# Patient Record
Sex: MALE | Race: OTHER | HISPANIC OR LATINO | URBAN - METROPOLITAN AREA
[De-identification: names, ages, dates, MRNs, and addresses within clinical notes are randomized per-mention and may not be internally consistent; named-entity substitution may affect disease eponyms.]

---

## 2019-10-10 ENCOUNTER — EMERGENCY (EMERGENCY)
Facility: HOSPITAL | Age: 34
LOS: 0 days | Discharge: HOME | End: 2019-10-10
Attending: HOSPITALIST
Payer: MEDICAID

## 2019-10-10 VITALS — HEIGHT: 65 IN | WEIGHT: 130.07 LBS

## 2019-10-10 VITALS
DIASTOLIC BLOOD PRESSURE: 80 MMHG | OXYGEN SATURATION: 100 % | TEMPERATURE: 98 F | SYSTOLIC BLOOD PRESSURE: 141 MMHG | RESPIRATION RATE: 18 BRPM | HEART RATE: 78 BPM

## 2019-10-10 DIAGNOSIS — H57.89 OTHER SPECIFIED DISORDERS OF EYE AND ADNEXA: ICD-10-CM

## 2019-10-10 DIAGNOSIS — H00.011 HORDEOLUM EXTERNUM RIGHT UPPER EYELID: ICD-10-CM

## 2019-10-10 PROCEDURE — 99283 EMERGENCY DEPT VISIT LOW MDM: CPT

## 2019-10-10 RX ORDER — ERYTHROMYCIN BASE 5 MG/GRAM
1 OINTMENT (GRAM) OPHTHALMIC (EYE) ONCE
Refills: 0 | Status: COMPLETED | OUTPATIENT
Start: 2019-10-10 | End: 2019-10-10

## 2019-10-10 RX ADMIN — Medication 1 APPLICATION(S): at 10:32

## 2019-10-10 NOTE — ED PROVIDER NOTE - NSFOLLOWUPINSTRUCTIONS_ED_ALL_ED_FT
Orzuelo  Stye  Image   Un orzuelo, también conocido hugo hordeolum, es angeles protuberancia que se forma en un párpado. Puede parecer un grano junto a la pestaña. Puede formarse dentro del párpado (orzuelo interno) o fuera del párpado (orzuelo externo). Un orzuelo puede causar enrojecimiento, hinchazón y dolor en el párpado.  Los orzuelos son muy frecuentes. Todas las personas pueden tener orzuelos a cualquier edad. Suelen ocurrir solo en un yasmeen, filomena puede tener más de jama en los dos ojos.  ¿Cuáles son las causas?  La causa de un orzuelo es angeles infección. La infección poncho siempre es causada por angeles bacteria llamada Staphylococcus aureus. Es un tipo común de bacteria que vive en la piel.  Un orzuelo interno puede ser causado por angeles infección en angeles glándula sebácea dentro del párpado. Un orzuelo externo puede ser causado por angeles infección en la base de la pestaña (folículo piloso).  ¿Qué incrementa el riesgo?  Angeles persona es más propensa a tener un orzuelo en los siguientes casos:  Si tuvo un orzuelo antes.Si tiene alguna de estas afecciones:  Diabetes.Enrojecimiento, picazón e inflamación en los párpados (blefaritis).Angeles afección de la piel llamada dermatitis seborreica o rosácea.Niveles altos de grasa en la keli (lípidos).¿Cuáles son los signos o síntomas?  El síntoma más frecuente del orzuelo es el dolor en el párpado. Los orzuelos internos son más dolorosos que los externos. Otros síntomas pueden ser los siguientes:  Hinchazón dolorosa del párpado.Sensación de picazón en el yasmeen.Lagrimeo y enrojecimiento del yasmeen.Pus que drena del orzuelo.¿Cómo se diagnostica?  Con tan solo examinarle el yasmeen, el médico puede diagnosticarle un orzuelo. También puede revisarlo para asegurarse de que:  No tenga fiebre ni otros signos de angeles infección más grave.La infección no se haya diseminado a otras partes del yasmeen o a zonas circundantes.¿Cómo se trata?  En la mayoría de los casos, el orzuelo desaparece en el transcurso de unos días sin tratamiento o con la aplicación de compresas tibias. Es posible que sea necesario usar gotas o pomada con antibiótico para tratar la infección.  En algunos casos, si el orzuelo no se lucio con el tratamiento de rutina, el médico puede drenarle el pus del orzuelo con un bisturí de hoja lauren o angeles aguja. Penermon puede hacerse si el orzuelo es isabelle, ocasiona mucho dolor o afecta la vista.  Siga estas indicaciones en garcia casa:  [place] los medicamentos de venta azeb y los recetados solamente hugo se lo haya indicado el médico. Estos incluyen gotas o pomadas para los ojos.Si le recetaron un antibiótico, aplíqueselo o úselo hugo se lo haya indicado el médico. No deje de usar el antibiótico, ni siquiera si el cuadro clínico mejora.Aplique un paño húmedo y tibio (compresa tibia) sobre el yasmeen bridgett 5 a 10 minutos, 4 veces al día.Limpie el párpado afectado hugo se lo haya indicado el médico.No use lentes de contacto ni maquillaje para los ojos hasta que el orzuelo se haya curado.No trate de reventar o drenar el orzuelo. No se frote el yasmeen.Comuníquese con un médico si:  Tiene escalofríos o fiebre.El orzuelo no desaparece después de varios días.El orzuelo afecta la visión.Comienza a sentir dolor en el globo ocular, o se le hincha o enrojece.Solicite ayuda de inmediato si:  Siente dolor al  el yasmeen.Resumen  Un orzuelo es angeles protuberancia que se forma en un párpado. Puede parecer un grano junto a la pestaña.Puede formarse dentro del párpado (orzuelo interno) o fuera del párpado (orzuelo externo). Un orzuelo puede causar enrojecimiento, hinchazón y dolor en el párpado.Con tan solo examinarle el yasmeen, el médico puede diagnosticarle un orzuelo.Aplique un paño húmedo y tibio (compresa tibia) sobre el yasmeen bridgett 5 a 10 minutos, 4 veces al día.Esta información no tiene hugo fin reemplazar el consejo del médico. Asegúrese de hacerle al médico cualquier pregunta que tenga.

## 2019-10-10 NOTE — ED ADULT TRIAGE NOTE - CHIEF COMPLAINT QUOTE
Pt c/o right upper eyelid swelling and pain x 1 week , and left eyelid painless nodule x 1 year , pt works as a  . Denies any visual disturbances

## 2019-10-10 NOTE — ED PROVIDER NOTE - NS_ ATTENDINGSCRIBEDETAILS _ED_A_ED_FT
Allegra Shay MD: The history, relevant review of systems, past medical and surgical history, medical decision making, and physical examination was documented by the scribe in my presence and I attest to the accuracy of the documentation.

## 2019-10-10 NOTE — ED PROVIDER NOTE - PATIENT PORTAL LINK FT
You can access the FollowMyHealth Patient Portal offered by [facility] by registering at the following website: [email or web address]. By joining [employer]’s FollowMyHealth portal, you will also be able to view your health information using other applications (apps) compatible with our system.

## 2019-10-10 NOTE — ED ADULT NURSE NOTE - OBJECTIVE STATEMENT
pt presents to ED c/o R upper eyelid pain and swelling x 1 wk, reports nodule to L eyelid x 1 yr. pt denies visual changes. denies trauma or injury, but works as  with machinery. has not taken anything for pain. denies fevers/chills.

## 2019-10-10 NOTE — ED PROVIDER NOTE - EYES, MLM
Clear bilaterally, pupils equal, round and reactive to light. +draining hordeolum to right upper eyelid with surrounding erythema, localized over bump with bump to left upper eyelid likely resolved hordeolum, 20/30 bilateral visual acuity

## 2019-10-10 NOTE — ED PROVIDER NOTE - OBJECTIVE STATEMENT
32 y/o male with no pertinent PMHx presents to the ED c/o 1 week right upper lid swelling and erythema with pus draining since yesterday. Pt had something like this on the other eye but didn't come to the hospital. Some photosensitivity. Pt is also having blurry vision and crusting in eye.   # [id number]

## 2019-10-10 NOTE — ED PROVIDER NOTE - NSFOLLOWUPCLINICS_GEN_ALL_ED_FT
[facility]  Family Medicine  [address] [place] [address]  Phone: [phone]  Fax:   Follow Up Time: 1-3 Days

## 2019-10-10 NOTE — ED PROVIDER NOTE - CLINICAL SUMMARY MEDICAL DECISION MAKING FREE TEXT BOX
35 y/o male with draining hordeolum to right upper eyelid recommended ibuprofen for pain warm compresses. Will give topical abx ointment and social work as per pt request. 32 y/o male with draining hordeolum to right upper eyelid recommended ibuprofen for pain warm compresses. Will give topical abx ointment and social work as per pt request.

## 2020-04-01 ENCOUNTER — EMERGENCY (EMERGENCY)
Facility: HOSPITAL | Age: 35
LOS: 0 days | Discharge: HOME | End: 2020-04-01
Payer: MEDICAID

## 2020-04-01 VITALS
HEART RATE: 84 BPM | DIASTOLIC BLOOD PRESSURE: 75 MMHG | TEMPERATURE: 98 F | SYSTOLIC BLOOD PRESSURE: 113 MMHG | RESPIRATION RATE: 16 BRPM | OXYGEN SATURATION: 100 %

## 2020-04-01 DIAGNOSIS — U07.1 COVID-19: ICD-10-CM

## 2020-04-01 DIAGNOSIS — Z20.828 CONTACT WITH AND (SUSPECTED) EXPOSURE TO OTHER VIRAL COMMUNICABLE DISEASES: ICD-10-CM

## 2020-04-01 DIAGNOSIS — R50.9 FEVER, UNSPECIFIED: ICD-10-CM

## 2020-04-01 PROBLEM — Z78.9 OTHER SPECIFIED HEALTH STATUS: Chronic | Status: ACTIVE | Noted: 2019-10-10

## 2020-04-01 PROCEDURE — 99283 EMERGENCY DEPT VISIT LOW MDM: CPT

## 2020-04-01 PROCEDURE — 87635 SARS-COV-2 COVID-19 AMP PRB: CPT

## 2020-04-01 NOTE — ED STATDOCS - PATIENT PORTAL LINK FT
You can access the FollowMyHealth Patient Portal offered by [facility] by registering at the following website: [email or web address]. By joining [employer]’s FollowMyHealth portal, you will also be able to view your health information using other applications (apps) compatible with our system.

## 2020-04-01 NOTE — ED STATDOCS - NSFOLLOWUPINSTRUCTIONS_ED_ALL_ED_FT
How to get your Coronavirus (COVID-19) Testing Results:   Please be advised that you were tested for the coronavirus (COVID-19) in the Emergency Department at [facility].  You are to maintain self-quarantine procedures for 14 days until instructed otherwise by one of our healthcare agents. Please note that the test may take up to 2-4 days to result.  If you do not hear from us within 72 hours and you'd like to check on your results, you can call on of our coronavirus specialists at [address] [place] [address] (available 24/7).  Please DO NOT call the site where you received the test to obtain your results.

## 2020-04-03 LAB — SARS-COV-2 RNA SPEC QL NAA+PROBE: (no result)

## 2020-04-19 ENCOUNTER — EMERGENCY (EMERGENCY)
Facility: HOSPITAL | Age: 35
LOS: 0 days | Discharge: HOME | End: 2020-04-19
Attending: EMERGENCY MEDICINE
Payer: MEDICAID

## 2020-04-19 VITALS
OXYGEN SATURATION: 99 % | RESPIRATION RATE: 17 BRPM | HEART RATE: 75 BPM | SYSTOLIC BLOOD PRESSURE: 108 MMHG | DIASTOLIC BLOOD PRESSURE: 80 MMHG

## 2020-04-19 VITALS
TEMPERATURE: 98 F | SYSTOLIC BLOOD PRESSURE: 114 MMHG | HEART RATE: 87 BPM | HEIGHT: 66 IN | WEIGHT: 134.92 LBS | DIASTOLIC BLOOD PRESSURE: 90 MMHG | RESPIRATION RATE: 19 BRPM | OXYGEN SATURATION: 97 %

## 2020-04-19 DIAGNOSIS — R06.02 SHORTNESS OF BREATH: ICD-10-CM

## 2020-04-19 DIAGNOSIS — R05 COUGH: ICD-10-CM

## 2020-04-19 DIAGNOSIS — M79.10 MYALGIA, UNSPECIFIED SITE: ICD-10-CM

## 2020-04-19 DIAGNOSIS — U07.1 COVID-19: ICD-10-CM

## 2020-04-19 LAB
ALBUMIN SERPL ELPH-MCNC: 4 G/DL — SIGNIFICANT CHANGE UP (ref 3.3–5)
ALP SERPL-CCNC: 98 U/L — SIGNIFICANT CHANGE UP (ref 40–120)
ALT FLD-CCNC: 113 U/L — HIGH (ref 12–78)
ANION GAP SERPL CALC-SCNC: 5 MMOL/L — SIGNIFICANT CHANGE UP (ref 5–17)
APTT BLD: 33.5 SEC — SIGNIFICANT CHANGE UP (ref 27.5–36.3)
AST SERPL-CCNC: 32 U/L — SIGNIFICANT CHANGE UP (ref 15–37)
BASOPHILS # BLD AUTO: 0.03 K/UL — SIGNIFICANT CHANGE UP (ref 0–0.2)
BASOPHILS NFR BLD AUTO: 0.7 % — SIGNIFICANT CHANGE UP (ref 0–2)
BILIRUB SERPL-MCNC: 0.8 MG/DL — SIGNIFICANT CHANGE UP (ref 0.2–1.2)
BUN SERPL-MCNC: 5 MG/DL — LOW (ref 7–23)
CALCIUM SERPL-MCNC: 9.2 MG/DL — SIGNIFICANT CHANGE UP (ref 8.5–10.1)
CHLORIDE SERPL-SCNC: 104 MMOL/L — SIGNIFICANT CHANGE UP (ref 96–108)
CO2 SERPL-SCNC: 29 MMOL/L — SIGNIFICANT CHANGE UP (ref 22–31)
CREAT SERPL-MCNC: 0.88 MG/DL — SIGNIFICANT CHANGE UP (ref 0.5–1.3)
CRP SERPL-MCNC: <0.1 MG/DL — SIGNIFICANT CHANGE UP (ref 0–0.4)
D DIMER BLD IA.RAPID-MCNC: <150 NG/ML DDU — SIGNIFICANT CHANGE UP
EOSINOPHIL # BLD AUTO: 0.03 K/UL — SIGNIFICANT CHANGE UP (ref 0–0.5)
EOSINOPHIL NFR BLD AUTO: 0.7 % — SIGNIFICANT CHANGE UP (ref 0–6)
FERRITIN SERPL-MCNC: 505 NG/ML — HIGH (ref 30–400)
GLUCOSE SERPL-MCNC: 108 MG/DL — HIGH (ref 70–99)
HCT VFR BLD CALC: 46 % — SIGNIFICANT CHANGE UP (ref 39–50)
HGB BLD-MCNC: 15.8 G/DL — SIGNIFICANT CHANGE UP (ref 13–17)
IMM GRANULOCYTES NFR BLD AUTO: 0.2 % — SIGNIFICANT CHANGE UP (ref 0–1.5)
INR BLD: 1.07 RATIO — SIGNIFICANT CHANGE UP (ref 0.88–1.16)
LDH SERPL L TO P-CCNC: 164 U/L — SIGNIFICANT CHANGE UP (ref 84–241)
LYMPHOCYTES # BLD AUTO: 0.96 K/UL — LOW (ref 1–3.3)
LYMPHOCYTES # BLD AUTO: 23.5 % — SIGNIFICANT CHANGE UP (ref 13–44)
MCHC RBC-ENTMCNC: 29.6 PG — SIGNIFICANT CHANGE UP (ref 27–34)
MCHC RBC-ENTMCNC: 34.3 GM/DL — SIGNIFICANT CHANGE UP (ref 32–36)
MCV RBC AUTO: 86.3 FL — SIGNIFICANT CHANGE UP (ref 80–100)
MONOCYTES # BLD AUTO: 0.32 K/UL — SIGNIFICANT CHANGE UP (ref 0–0.9)
MONOCYTES NFR BLD AUTO: 7.8 % — SIGNIFICANT CHANGE UP (ref 2–14)
NEUTROPHILS # BLD AUTO: 2.73 K/UL — SIGNIFICANT CHANGE UP (ref 1.8–7.4)
NEUTROPHILS NFR BLD AUTO: 67.1 % — SIGNIFICANT CHANGE UP (ref 43–77)
PLATELET # BLD AUTO: 296 K/UL — SIGNIFICANT CHANGE UP (ref 150–400)
POTASSIUM SERPL-MCNC: 4.1 MMOL/L — SIGNIFICANT CHANGE UP (ref 3.5–5.3)
POTASSIUM SERPL-SCNC: 4.1 MMOL/L — SIGNIFICANT CHANGE UP (ref 3.5–5.3)
PROCALCITONIN SERPL-MCNC: 0.04 NG/ML — SIGNIFICANT CHANGE UP (ref 0.02–0.1)
PROT SERPL-MCNC: 7.9 GM/DL — SIGNIFICANT CHANGE UP (ref 6–8.3)
PROTHROM AB SERPL-ACNC: 11.9 SEC — SIGNIFICANT CHANGE UP (ref 10–12.9)
RBC # BLD: 5.33 M/UL — SIGNIFICANT CHANGE UP (ref 4.2–5.8)
RBC # FLD: 12 % — SIGNIFICANT CHANGE UP (ref 10.3–14.5)
SODIUM SERPL-SCNC: 138 MMOL/L — SIGNIFICANT CHANGE UP (ref 135–145)
WBC # BLD: 4.08 K/UL — SIGNIFICANT CHANGE UP (ref 3.8–10.5)
WBC # FLD AUTO: 4.08 K/UL — SIGNIFICANT CHANGE UP (ref 3.8–10.5)

## 2020-04-19 PROCEDURE — 85379 FIBRIN DEGRADATION QUANT: CPT

## 2020-04-19 PROCEDURE — 99283 EMERGENCY DEPT VISIT LOW MDM: CPT | Mod: 25

## 2020-04-19 PROCEDURE — 80053 COMPREHEN METABOLIC PANEL: CPT

## 2020-04-19 PROCEDURE — 71045 X-RAY EXAM CHEST 1 VIEW: CPT | Mod: 26

## 2020-04-19 PROCEDURE — 84145 PROCALCITONIN (PCT): CPT

## 2020-04-19 PROCEDURE — 82728 ASSAY OF FERRITIN: CPT

## 2020-04-19 PROCEDURE — 93005 ELECTROCARDIOGRAM TRACING: CPT

## 2020-04-19 PROCEDURE — 36415 COLL VENOUS BLD VENIPUNCTURE: CPT

## 2020-04-19 PROCEDURE — 71045 X-RAY EXAM CHEST 1 VIEW: CPT

## 2020-04-19 PROCEDURE — 99283 EMERGENCY DEPT VISIT LOW MDM: CPT

## 2020-04-19 PROCEDURE — 93010 ELECTROCARDIOGRAM REPORT: CPT

## 2020-04-19 PROCEDURE — 85025 COMPLETE CBC W/AUTO DIFF WBC: CPT

## 2020-04-19 PROCEDURE — 83615 LACTATE (LD) (LDH) ENZYME: CPT

## 2020-04-19 PROCEDURE — 85730 THROMBOPLASTIN TIME PARTIAL: CPT

## 2020-04-19 PROCEDURE — 86140 C-REACTIVE PROTEIN: CPT

## 2020-04-19 PROCEDURE — 85610 PROTHROMBIN TIME: CPT

## 2020-04-19 RX ORDER — ACETAMINOPHEN 500 MG
1000 TABLET ORAL ONCE
Refills: 0 | Status: COMPLETED | OUTPATIENT
Start: 2020-04-19 | End: 2020-04-19

## 2020-04-19 RX ADMIN — Medication 1000 MILLIGRAM(S): at 10:59

## 2020-04-19 NOTE — ED PROVIDER NOTE - PATIENT PORTAL LINK FT
You can access the FollowMyHealth Patient Portal offered by [facility] by registering at the following website: [email or web address]. By joining [employer]’s FollowMyHealth portal, you will also be able to view your health information using other applications (apps) compatible with our system.

## 2020-04-19 NOTE — ED PROVIDER NOTE - CONSTITUTIONAL, MLM
normal... +Mildly uncomfortable. Well appearing, awake, alert, oriented to person, place, time/situation.

## 2020-04-19 NOTE — ED ADULT TRIAGE NOTE - CHIEF COMPLAINT QUOTE
Pt ambulatory to pivot triage for evaluation of back pain, COVID+, in no respiratory distress, oxygen sat on RA is 99%

## 2020-04-19 NOTE — ED PROVIDER NOTE - OBJECTIVE STATEMENT
34 YOM no PMHx presents to the ED c/o 2 weeks of COVID symptoms. [employer]  Santi used for Persian translation. Pt presenting with 2 weeks of cough, body aches and headaches. Pt had some shortness of breath today and is known COVID + so came to ER for evaluation. Pt has been using Tylenol at home without lasting relief.

## 2020-04-19 NOTE — ED ADULT NURSE NOTE - OBJECTIVE STATEMENT
pt came to ED for evaluation of shortness of breath, cough, fever, and body aches x 2 weeks. pt COVID +. denies any other complaints

## 2020-04-19 NOTE — ED PROVIDER NOTE - NSFOLLOWUPCLINICS_GEN_ALL_ED_FT
[facility]  Family Medicine  [address] [place] [address]  Phone: [phone]  Fax:   Follow Up Time:

## 2020-04-19 NOTE — ED PROVIDER NOTE - NSFOLLOWUPINSTRUCTIONS_ED_ALL_ED_FT
Acetaminophen for pain/fever  Fluids  See printed COVID instructions  Follow-up with your regular physician or the [facility]  Return with any persistent/worsening symptoms.

## 2020-05-08 ENCOUNTER — EMERGENCY (EMERGENCY)
Facility: HOSPITAL | Age: 35
LOS: 0 days | Discharge: HOME | End: 2020-05-08
Attending: EMERGENCY MEDICINE
Payer: MEDICAID

## 2020-05-08 VITALS — HEIGHT: 66 IN | WEIGHT: 149.91 LBS

## 2020-05-08 VITALS
RESPIRATION RATE: 18 BRPM | SYSTOLIC BLOOD PRESSURE: 120 MMHG | HEART RATE: 74 BPM | DIASTOLIC BLOOD PRESSURE: 76 MMHG | OXYGEN SATURATION: 100 % | TEMPERATURE: 100 F

## 2020-05-08 DIAGNOSIS — R07.89 OTHER CHEST PAIN: ICD-10-CM

## 2020-05-08 DIAGNOSIS — U07.1 COVID-19: ICD-10-CM

## 2020-05-08 DIAGNOSIS — R30.0 DYSURIA: ICD-10-CM

## 2020-05-08 LAB
ALBUMIN SERPL ELPH-MCNC: 4.3 G/DL — SIGNIFICANT CHANGE UP (ref 3.3–5)
ALP SERPL-CCNC: 92 U/L — SIGNIFICANT CHANGE UP (ref 40–120)
ALT FLD-CCNC: 93 U/L — HIGH (ref 12–78)
ANION GAP SERPL CALC-SCNC: 2 MMOL/L — LOW (ref 5–17)
APPEARANCE UR: CLEAR — SIGNIFICANT CHANGE UP
APTT BLD: 33.4 SEC — SIGNIFICANT CHANGE UP (ref 27.5–36.3)
AST SERPL-CCNC: 32 U/L — SIGNIFICANT CHANGE UP (ref 15–37)
BASOPHILS # BLD AUTO: 0.01 K/UL — SIGNIFICANT CHANGE UP (ref 0–0.2)
BASOPHILS NFR BLD AUTO: 0.2 % — SIGNIFICANT CHANGE UP (ref 0–2)
BILIRUB SERPL-MCNC: 0.6 MG/DL — SIGNIFICANT CHANGE UP (ref 0.2–1.2)
BILIRUB UR-MCNC: NEGATIVE — SIGNIFICANT CHANGE UP
BUN SERPL-MCNC: 6 MG/DL — LOW (ref 7–23)
CALCIUM SERPL-MCNC: 9.7 MG/DL — SIGNIFICANT CHANGE UP (ref 8.5–10.1)
CHLORIDE SERPL-SCNC: 105 MMOL/L — SIGNIFICANT CHANGE UP (ref 96–108)
CO2 SERPL-SCNC: 33 MMOL/L — HIGH (ref 22–31)
COLOR SPEC: YELLOW — SIGNIFICANT CHANGE UP
CREAT SERPL-MCNC: 0.8 MG/DL — SIGNIFICANT CHANGE UP (ref 0.5–1.3)
D DIMER BLD IA.RAPID-MCNC: <150 NG/ML DDU — SIGNIFICANT CHANGE UP
DIFF PNL FLD: NEGATIVE — SIGNIFICANT CHANGE UP
EOSINOPHIL # BLD AUTO: 0.18 K/UL — SIGNIFICANT CHANGE UP (ref 0–0.5)
EOSINOPHIL NFR BLD AUTO: 3.5 % — SIGNIFICANT CHANGE UP (ref 0–6)
GLUCOSE SERPL-MCNC: 83 MG/DL — SIGNIFICANT CHANGE UP (ref 70–99)
GLUCOSE UR QL: NEGATIVE MG/DL — SIGNIFICANT CHANGE UP
HCT VFR BLD CALC: 46.3 % — SIGNIFICANT CHANGE UP (ref 39–50)
HGB BLD-MCNC: 16 G/DL — SIGNIFICANT CHANGE UP (ref 13–17)
IMM GRANULOCYTES NFR BLD AUTO: 0.4 % — SIGNIFICANT CHANGE UP (ref 0–1.5)
INR BLD: 1 RATIO — SIGNIFICANT CHANGE UP (ref 0.88–1.16)
KETONES UR-MCNC: NEGATIVE — SIGNIFICANT CHANGE UP
LEUKOCYTE ESTERASE UR-ACNC: NEGATIVE — SIGNIFICANT CHANGE UP
LYMPHOCYTES # BLD AUTO: 1.4 K/UL — SIGNIFICANT CHANGE UP (ref 1–3.3)
LYMPHOCYTES # BLD AUTO: 27.1 % — SIGNIFICANT CHANGE UP (ref 13–44)
MCHC RBC-ENTMCNC: 29.6 PG — SIGNIFICANT CHANGE UP (ref 27–34)
MCHC RBC-ENTMCNC: 34.6 GM/DL — SIGNIFICANT CHANGE UP (ref 32–36)
MCV RBC AUTO: 85.7 FL — SIGNIFICANT CHANGE UP (ref 80–100)
MONOCYTES # BLD AUTO: 0.45 K/UL — SIGNIFICANT CHANGE UP (ref 0–0.9)
MONOCYTES NFR BLD AUTO: 8.7 % — SIGNIFICANT CHANGE UP (ref 2–14)
NEUTROPHILS # BLD AUTO: 3.11 K/UL — SIGNIFICANT CHANGE UP (ref 1.8–7.4)
NEUTROPHILS NFR BLD AUTO: 60.1 % — SIGNIFICANT CHANGE UP (ref 43–77)
NITRITE UR-MCNC: NEGATIVE — SIGNIFICANT CHANGE UP
PH UR: 7 — SIGNIFICANT CHANGE UP (ref 5–8)
PLATELET # BLD AUTO: 245 K/UL — SIGNIFICANT CHANGE UP (ref 150–400)
POTASSIUM SERPL-MCNC: 4.5 MMOL/L — SIGNIFICANT CHANGE UP (ref 3.5–5.3)
POTASSIUM SERPL-SCNC: 4.5 MMOL/L — SIGNIFICANT CHANGE UP (ref 3.5–5.3)
PROT SERPL-MCNC: 8.1 GM/DL — SIGNIFICANT CHANGE UP (ref 6–8.3)
PROT UR-MCNC: NEGATIVE MG/DL — SIGNIFICANT CHANGE UP
PROTHROM AB SERPL-ACNC: 11.1 SEC — SIGNIFICANT CHANGE UP (ref 10–12.9)
RBC # BLD: 5.4 M/UL — SIGNIFICANT CHANGE UP (ref 4.2–5.8)
RBC # FLD: 12.2 % — SIGNIFICANT CHANGE UP (ref 10.3–14.5)
SODIUM SERPL-SCNC: 140 MMOL/L — SIGNIFICANT CHANGE UP (ref 135–145)
SP GR SPEC: 1 — LOW (ref 1.01–1.02)
TROPONIN I SERPL-MCNC: <0.015 NG/ML — SIGNIFICANT CHANGE UP (ref 0.01–0.04)
UROBILINOGEN FLD QL: NEGATIVE MG/DL — SIGNIFICANT CHANGE UP
WBC # BLD: 5.17 K/UL — SIGNIFICANT CHANGE UP (ref 3.8–10.5)
WBC # FLD AUTO: 5.17 K/UL — SIGNIFICANT CHANGE UP (ref 3.8–10.5)

## 2020-05-08 PROCEDURE — 96361 HYDRATE IV INFUSION ADD-ON: CPT

## 2020-05-08 PROCEDURE — 81003 URINALYSIS AUTO W/O SCOPE: CPT

## 2020-05-08 PROCEDURE — 85730 THROMBOPLASTIN TIME PARTIAL: CPT

## 2020-05-08 PROCEDURE — 93010 ELECTROCARDIOGRAM REPORT: CPT

## 2020-05-08 PROCEDURE — 85025 COMPLETE CBC W/AUTO DIFF WBC: CPT

## 2020-05-08 PROCEDURE — 96374 THER/PROPH/DIAG INJ IV PUSH: CPT

## 2020-05-08 PROCEDURE — 85379 FIBRIN DEGRADATION QUANT: CPT

## 2020-05-08 PROCEDURE — 99284 EMERGENCY DEPT VISIT MOD MDM: CPT

## 2020-05-08 PROCEDURE — 71045 X-RAY EXAM CHEST 1 VIEW: CPT

## 2020-05-08 PROCEDURE — 80053 COMPREHEN METABOLIC PANEL: CPT

## 2020-05-08 PROCEDURE — 36415 COLL VENOUS BLD VENIPUNCTURE: CPT

## 2020-05-08 PROCEDURE — 85610 PROTHROMBIN TIME: CPT

## 2020-05-08 PROCEDURE — 93005 ELECTROCARDIOGRAM TRACING: CPT

## 2020-05-08 PROCEDURE — 87086 URINE CULTURE/COLONY COUNT: CPT

## 2020-05-08 PROCEDURE — 71045 X-RAY EXAM CHEST 1 VIEW: CPT | Mod: 26

## 2020-05-08 PROCEDURE — 99284 EMERGENCY DEPT VISIT MOD MDM: CPT | Mod: 25

## 2020-05-08 PROCEDURE — 84484 ASSAY OF TROPONIN QUANT: CPT

## 2020-05-08 RX ORDER — SODIUM CHLORIDE 9 MG/ML
1000 INJECTION INTRAMUSCULAR; INTRAVENOUS; SUBCUTANEOUS ONCE
Refills: 0 | Status: COMPLETED | OUTPATIENT
Start: 2020-05-08 | End: 2020-05-08

## 2020-05-08 RX ORDER — KETOROLAC TROMETHAMINE 30 MG/ML
15 SYRINGE (ML) INJECTION ONCE
Refills: 0 | Status: DISCONTINUED | OUTPATIENT
Start: 2020-05-08 | End: 2020-05-08

## 2020-05-08 RX ADMIN — Medication 15 MILLIGRAM(S): at 12:10

## 2020-05-08 RX ADMIN — SODIUM CHLORIDE 1000 MILLILITER(S): 9 INJECTION INTRAMUSCULAR; INTRAVENOUS; SUBCUTANEOUS at 13:15

## 2020-05-08 RX ADMIN — SODIUM CHLORIDE 1000 MILLILITER(S): 9 INJECTION INTRAMUSCULAR; INTRAVENOUS; SUBCUTANEOUS at 12:15

## 2020-05-08 NOTE — ED PROVIDER NOTE - PROGRESS NOTE DETAILS
ID [id number].  Patient seen and evaluated by me, well appearing, presenting with concern about persistent chest pain radiating to back since being diagnosed with COVID.  He is worried he may have a secondary infection, either in lung or kidney as he also has intermittent dysuria.  There are no acute findings on workup.  patient reassured.  copy of results provided.  Recommended he follow up with his PMD, Dr. Valdez, in the office if symptoms persist -Luciano Fountain PA-C Low risk acs given age and lack of risk factors.  Single trop sufficient for eval given timing of symptoms. EKG non-ischemic.  Trop negativer.  Stable for outpatient f/u.  NO e/o ptx/pna/carditis.  Story not c/w PE and pt low risk by criteria and dimer negative.  Story not c/w dissection - below threshold for further w/u.  D/c home with strict return precautions and prompt outpatient f/u.

## 2020-05-08 NOTE — ED ADULT TRIAGE NOTE - CHIEF COMPLAINT QUOTE
Pt ambulatory to pivot triage for evaluation of left upper and left flank pain, COVID+, denies fever, denies nausea, denies urinary symptoms but thinks he may have a kidney infection

## 2020-05-08 NOTE — ED PROVIDER NOTE - CLINICAL SUMMARY MEDICAL DECISION MAKING FREE TEXT BOX
33 y/o male with no significant PMHx is well appearing and recovering from COVID with persistent left sided chest discomfort. Plan - EKG, CXR, labs.

## 2020-05-08 NOTE — ED PROVIDER NOTE - PATIENT PORTAL LINK FT
You can access the FollowMyHealth Patient Portal offered by [facility] by registering at the following website: [email or web address]. By joining [employer]’s FollowMyHealth portal, you will also be able to view your health information using other applications (apps) compatible with our system.

## 2020-05-08 NOTE — ED PROVIDER NOTE - OBJECTIVE STATEMENT
35 y/o male with a PMHx of COVID from 4/1 presents to the ED c/o persistent left sided chest discomfort since COVID diagnosis. Otherwise, he has fully recovered. Pt unsure if anything else is going on. Pt reports dysuria. Pt  ID: [id number].

## 2020-05-08 NOTE — ED PROVIDER NOTE - NSFOLLOWUPINSTRUCTIONS_ED_ALL_ED_FT
Pleuresía  Pleurisy  La pleuresía es la irritación e hinchazón (inflamación) de las membranas de los pulmones (pleura). Puede causar dolor en el pecho, la espalda o el hombro. También puede causar dificultad para respirar.  Siga estas indicaciones en garcia casa:  Medicamentos     [place] los medicamentos de venta azeb y los recetados solamente hugo se lo haya indicado el médico.Si le recetaron antibióticos, tómelos hugo se lo haya indicado el médico. No deje de brenton los antibióticos aunque comience a sentirse mejor.Actividad     Descanse y retome kathleen actividades normales hugo se lo haya indicado el médico. Pregúntele al médico qué actividades son seguras para usted.No conduzca ni use maquinaria pesada mientras rae analgésicos recetados.Instrucciones generales        Controle la afección para detectar cualquier cambio.[place] inhalaciones profundas, aunque le provoque dolor. [place] puede ayudar a prevenir problemas pulmonares.Al hacer reposo, acuéstese sobre el costado que siente dolor. [place] puede ayudarlo a sentir menos dolor.No fume. Si necesita ayuda para dejar de fumar, consulte al médico.Concurra a todas las visitas de control hugo se lo haya indicado el médico. [place] es importante.Comuníquese con un médico si:  Tiene un dolor con estas características:  Empeoran.No mejora con medicamentos.Dura más de 1 semana.Tiene fiebre o siente escalofríos.Tiene tos que no mejora en garcia casa.Tiene dificultad para respirar que no mejora en garcia casa.Expectora líquido que parece pus (secreciones purulentas).Solicite ayuda de inmediato si:  Kathleen labios o uñas de los dedos de las mariah o los pies se tornan oscuros o de color naheed.Tose y escupe keli.Tiene dificultad para respirar que empeora.Hace ruidos guzman cuando respira (sibilancia) que empeoran.Tiene dolor que se extiende hacia el miranda, los brazos o la mandíbula.Aparece angeles erupción cutánea.Vomita.Pierde el conocimiento (se desmaya).Resumen  La pleuresía es la irritación e hinchazón (inflamación) de las membranas de los pulmones (pleura).La pleuresía puede causar dolor y dificultad para respirar.Si tiene tos que no mejora en garcia casa, comuníquese con el médico.Obtenga ayuda de inmediato si tiene dificultad para respirar que empeora progresivamente.Esta información no tiene hugo fin reemplazar el consejo del médico. Asegúrese de hacerle al médico cualquier pregunta que tenga.

## 2020-05-09 LAB
CULTURE RESULTS: NO GROWTH — SIGNIFICANT CHANGE UP
SPECIMEN SOURCE: SIGNIFICANT CHANGE UP

## 2020-05-15 ENCOUNTER — TRANSCRIPTION ENCOUNTER (OUTPATIENT)
Age: 35
End: 2020-05-15

## 2020-05-27 ENCOUNTER — TRANSCRIPTION ENCOUNTER (OUTPATIENT)
Age: 35
End: 2020-05-27

## 2020-06-01 ENCOUNTER — APPOINTMENT (OUTPATIENT)
Dept: RADIOLOGY | Facility: CLINIC | Age: 35
End: 2020-06-01
Payer: MEDICAID

## 2020-06-01 ENCOUNTER — OUTPATIENT (OUTPATIENT)
Dept: OUTPATIENT SERVICES | Facility: HOSPITAL | Age: 35
LOS: 1 days | End: 2020-06-01
Payer: MEDICAID

## 2020-06-01 DIAGNOSIS — Z00.8 ENCOUNTER FOR OTHER GENERAL EXAMINATION: ICD-10-CM

## 2020-06-01 PROBLEM — Z00.00 ENCOUNTER FOR PREVENTIVE HEALTH EXAMINATION: Status: ACTIVE | Noted: 2020-06-01

## 2020-06-01 PROCEDURE — 71046 X-RAY EXAM CHEST 2 VIEWS: CPT | Mod: 26

## 2020-06-01 PROCEDURE — 71046 X-RAY EXAM CHEST 2 VIEWS: CPT

## 2020-06-09 ENCOUNTER — OUTPATIENT (OUTPATIENT)
Dept: OUTPATIENT SERVICES | Facility: HOSPITAL | Age: 35
LOS: 1 days | End: 2020-06-09
Payer: COMMERCIAL

## 2020-06-09 ENCOUNTER — APPOINTMENT (OUTPATIENT)
Dept: RADIOLOGY | Facility: CLINIC | Age: 35
End: 2020-06-09
Payer: COMMERCIAL

## 2020-06-09 DIAGNOSIS — Z00.8 ENCOUNTER FOR OTHER GENERAL EXAMINATION: ICD-10-CM

## 2020-06-09 PROCEDURE — 71046 X-RAY EXAM CHEST 2 VIEWS: CPT | Mod: 26

## 2020-06-09 PROCEDURE — 71046 X-RAY EXAM CHEST 2 VIEWS: CPT

## 2020-07-07 ENCOUNTER — EMERGENCY (EMERGENCY)
Facility: HOSPITAL | Age: 35
LOS: 0 days | Discharge: HOME | End: 2020-07-07
Attending: EMERGENCY MEDICINE
Payer: MEDICAID

## 2020-07-07 ENCOUNTER — TRANSCRIPTION ENCOUNTER (OUTPATIENT)
Age: 35
End: 2020-07-07

## 2020-07-07 VITALS
SYSTOLIC BLOOD PRESSURE: 114 MMHG | TEMPERATURE: 99 F | HEART RATE: 64 BPM | RESPIRATION RATE: 16 BRPM | OXYGEN SATURATION: 100 % | DIASTOLIC BLOOD PRESSURE: 82 MMHG

## 2020-07-07 VITALS — HEIGHT: 65 IN | WEIGHT: 149.03 LBS

## 2020-07-07 DIAGNOSIS — R05 COUGH: ICD-10-CM

## 2020-07-07 DIAGNOSIS — R07.9 CHEST PAIN, UNSPECIFIED: ICD-10-CM

## 2020-07-07 DIAGNOSIS — B94.8 SEQUELAE OF OTHER SPECIFIED INFECTIOUS AND PARASITIC DISEASES: ICD-10-CM

## 2020-07-07 DIAGNOSIS — R06.02 SHORTNESS OF BREATH: ICD-10-CM

## 2020-07-07 LAB
ALBUMIN SERPL ELPH-MCNC: 4.3 G/DL — SIGNIFICANT CHANGE UP (ref 3.3–5)
ALP SERPL-CCNC: 89 U/L — SIGNIFICANT CHANGE UP (ref 40–120)
ALT FLD-CCNC: 99 U/L — HIGH (ref 12–78)
ANION GAP SERPL CALC-SCNC: 4 MMOL/L — LOW (ref 5–17)
APPEARANCE UR: CLEAR — SIGNIFICANT CHANGE UP
APTT BLD: 36.7 SEC — HIGH (ref 27.5–35.5)
AST SERPL-CCNC: 38 U/L — HIGH (ref 15–37)
BILIRUB SERPL-MCNC: 0.9 MG/DL — SIGNIFICANT CHANGE UP (ref 0.2–1.2)
BILIRUB UR-MCNC: NEGATIVE — SIGNIFICANT CHANGE UP
BUN SERPL-MCNC: 7 MG/DL — SIGNIFICANT CHANGE UP (ref 7–23)
CALCIUM SERPL-MCNC: 9.4 MG/DL — SIGNIFICANT CHANGE UP (ref 8.5–10.1)
CHLORIDE SERPL-SCNC: 107 MMOL/L — SIGNIFICANT CHANGE UP (ref 96–108)
CO2 SERPL-SCNC: 29 MMOL/L — SIGNIFICANT CHANGE UP (ref 22–31)
COLOR SPEC: YELLOW — SIGNIFICANT CHANGE UP
CREAT SERPL-MCNC: 0.85 MG/DL — SIGNIFICANT CHANGE UP (ref 0.5–1.3)
D DIMER BLD IA.RAPID-MCNC: <150 NG/ML DDU — SIGNIFICANT CHANGE UP
DIFF PNL FLD: NEGATIVE — SIGNIFICANT CHANGE UP
GLUCOSE SERPL-MCNC: 98 MG/DL — SIGNIFICANT CHANGE UP (ref 70–99)
GLUCOSE UR QL: NEGATIVE MG/DL — SIGNIFICANT CHANGE UP
HCT VFR BLD CALC: 47.2 % — SIGNIFICANT CHANGE UP (ref 39–50)
HGB BLD-MCNC: 16.6 G/DL — SIGNIFICANT CHANGE UP (ref 13–17)
INR BLD: 1.06 RATIO — SIGNIFICANT CHANGE UP (ref 0.88–1.16)
KETONES UR-MCNC: NEGATIVE — SIGNIFICANT CHANGE UP
LEUKOCYTE ESTERASE UR-ACNC: NEGATIVE — SIGNIFICANT CHANGE UP
MCHC RBC-ENTMCNC: 30.9 PG — SIGNIFICANT CHANGE UP (ref 27–34)
MCHC RBC-ENTMCNC: 35.2 GM/DL — SIGNIFICANT CHANGE UP (ref 32–36)
MCV RBC AUTO: 87.7 FL — SIGNIFICANT CHANGE UP (ref 80–100)
NITRITE UR-MCNC: NEGATIVE — SIGNIFICANT CHANGE UP
PH UR: 6 — SIGNIFICANT CHANGE UP (ref 5–8)
PLATELET # BLD AUTO: 254 K/UL — SIGNIFICANT CHANGE UP (ref 150–400)
POTASSIUM SERPL-MCNC: 4.3 MMOL/L — SIGNIFICANT CHANGE UP (ref 3.5–5.3)
POTASSIUM SERPL-SCNC: 4.3 MMOL/L — SIGNIFICANT CHANGE UP (ref 3.5–5.3)
PROT SERPL-MCNC: 8.3 GM/DL — SIGNIFICANT CHANGE UP (ref 6–8.3)
PROT UR-MCNC: NEGATIVE MG/DL — SIGNIFICANT CHANGE UP
PROTHROM AB SERPL-ACNC: 12.3 SEC — SIGNIFICANT CHANGE UP (ref 10.6–13.6)
RBC # BLD: 5.38 M/UL — SIGNIFICANT CHANGE UP (ref 4.2–5.8)
RBC # FLD: 12.2 % — SIGNIFICANT CHANGE UP (ref 10.3–14.5)
SODIUM SERPL-SCNC: 140 MMOL/L — SIGNIFICANT CHANGE UP (ref 135–145)
SP GR SPEC: 1.01 — SIGNIFICANT CHANGE UP (ref 1.01–1.02)
TROPONIN I SERPL-MCNC: <0.015 NG/ML — SIGNIFICANT CHANGE UP (ref 0.01–0.04)
TROPONIN I SERPL-MCNC: <0.015 NG/ML — SIGNIFICANT CHANGE UP (ref 0.01–0.04)
UROBILINOGEN FLD QL: NEGATIVE MG/DL — SIGNIFICANT CHANGE UP
WBC # BLD: 4.72 K/UL — SIGNIFICANT CHANGE UP (ref 3.8–10.5)
WBC # FLD AUTO: 4.72 K/UL — SIGNIFICANT CHANGE UP (ref 3.8–10.5)

## 2020-07-07 PROCEDURE — 93005 ELECTROCARDIOGRAM TRACING: CPT

## 2020-07-07 PROCEDURE — 85379 FIBRIN DEGRADATION QUANT: CPT

## 2020-07-07 PROCEDURE — 99284 EMERGENCY DEPT VISIT MOD MDM: CPT | Mod: 25

## 2020-07-07 PROCEDURE — 99285 EMERGENCY DEPT VISIT HI MDM: CPT

## 2020-07-07 PROCEDURE — 87086 URINE CULTURE/COLONY COUNT: CPT

## 2020-07-07 PROCEDURE — 80053 COMPREHEN METABOLIC PANEL: CPT

## 2020-07-07 PROCEDURE — 36415 COLL VENOUS BLD VENIPUNCTURE: CPT

## 2020-07-07 PROCEDURE — 71275 CT ANGIOGRAPHY CHEST: CPT | Mod: 26

## 2020-07-07 PROCEDURE — 93010 ELECTROCARDIOGRAM REPORT: CPT

## 2020-07-07 PROCEDURE — 84484 ASSAY OF TROPONIN QUANT: CPT

## 2020-07-07 PROCEDURE — 85027 COMPLETE CBC AUTOMATED: CPT

## 2020-07-07 PROCEDURE — 85730 THROMBOPLASTIN TIME PARTIAL: CPT

## 2020-07-07 PROCEDURE — 87491 CHLMYD TRACH DNA AMP PROBE: CPT

## 2020-07-07 PROCEDURE — 87591 N.GONORRHOEAE DNA AMP PROB: CPT

## 2020-07-07 PROCEDURE — 71275 CT ANGIOGRAPHY CHEST: CPT

## 2020-07-07 PROCEDURE — 81003 URINALYSIS AUTO W/O SCOPE: CPT

## 2020-07-07 PROCEDURE — 85610 PROTHROMBIN TIME: CPT

## 2020-07-07 NOTE — ED STATDOCS - NSFOLLOWUPINSTRUCTIONS_ED_ALL_ED_FT
Tos en los adultos  Cough, Adult  La tos es un reflejo que despeja la garganta y las vías respiratorias (sistema respiratorio). Toser ayuda a curar y proteger los pulmones. Es normal toser a veces, filomena si la tos aparece junto con otros síntomas o si dura mucho tiempo, puede indicar angeles afección que necesita tratamiento. [place] tos aguda puede durar entre 2 o 3 semanas, mientras que angeles tos crónica puede durar 8 semanas o más tiempo.  Por lo general, las causas de la tos son las siguientes:  Infección del sistema respiratoriopor virus o bacterias.Aspirar sustancias que irritan los pulmones.Alergias.Asma.Mucosidad que se desliza por la parte posterior de la garganta (goteo posnasal).Fumar.Ácido que vuelve del estómago hacia el esófago (reflujo gastroesofágico).Ciertos medicamentos.Problemas pulmonares crónicos.Otras enfermedades, hugo insuficiencia cardíaca o un coágulo de keli en el pulmón (embolia pulmonar).Siga estas instrucciones en garcia casa:  Medicamentos     [place] los medicamentos de venta azeb y los recetados solamente hugo se lo haya indicado el médico.Hable con el médico antes de brenton un medicamento para la tos (antitusivo).Estilo de jay        Evite el humo del cigarrillo. No consuma ningún producto que contenga nicotina o tabaco, hugo cigarrillos, cigarrillos electrónicos y tabaco de mascar. Si necesita ayuda para dejar de fumar, consulte al médico.Lion suficiente líquido hugo para mantener la orina de color amarillo pálido.Evite la cafeína.No lion alcohol si el médico se lo prohíbe.Instrucciones generales        Esté muy atento a los cambios en la tos. Informe al médico acerca de los cambios.Siempre cúbrase la boca al toser.Evite las cosas que lo valorie toser, hugo perfumes, gigi, productos de limpieza o humo de fogatas o de tabaco.Si el aire está seco, use un humidificador o un vaporizador de dary fría en la habitación o en la casa para ayudar a aflojar las secreciones.Si la tos empeora por la noche, pruebe a dormir en posición semierguida.Descanse todo lo que sea necesario.Concurra a todas las visitas de seguimiento hugo se lo haya indicado el médico. [place] es importante.Comuníquese con un médico si:  Tiene nuevos síntomas.Tose y escupe pus.Tiene angeles tos que no mejora después de 2 o 3 semanas, o que empeora.No puede controlar la tos con antitusivos y no puede dormir debido a ello.Siente un dolor que empeora o un dolor que no se isi con medicamentos.Tiene fiebre.Baja de peso sin causa aparente.Transpira bridgett la noche.Solicite ayuda inmediatamente si:  Tose y escupe keli.Tiene dificultad para respirar.El corazón le late muy rápido.Estos síntomas pueden representar un problema grave que constituye angeles emergencia. No espere a felix si los síntomas desaparecen. Solicite atención médica de inmediato. Comuníquese con el servicio de emergencias de garcia localidad (911 en los [place]). No conduzca por da propios medios hasta el hospital.   Resumen  La tos es un reflejo que despeja la garganta y las vías respiratorias. Es normal toser a veces, filomena si la tos aparece junto con otros síntomas o si dura mucho tiempo, puede indicar angeles afección que necesita tratamiento.[place] los medicamentos de venta azeb y los recetados solamente hugo se lo haya indicado el médico.Siempre cúbrase la boca al toser.Comuníquese con un médico si tiene síntomas nuevos, o angeles tos que no mejora después de 2 o 3 semanas o que empeora.Esta información no tiene hugo fin reemplazar el consejo del médico. Asegúrese de hacerle al médico cualquier pregunta que tenga.

## 2020-07-07 NOTE — ED STATDOCS - PATIENT PORTAL LINK FT
You can access the FollowMyHealth Patient Portal offered by [facility] by registering at the following website: [email or web address]. By joining [employer]’s FollowMyHealth portal, you will also be able to view your health information using other applications (apps) compatible with our system.

## 2020-07-07 NOTE — ED STATDOCS - CARDIOVASCULAR [+], MLM
See [employer] [place] report. Remote monitoring reviewed over a 30 day period.   End of 30 day monitoring period date of service 05/29/2020 CHEST PAIN

## 2020-07-07 NOTE — ED STATDOCS - PROGRESS NOTE DETAILS
id [id number].  Patient seen and evaluated.  well appearing.  reviewed all results with patient, no acute findings.  cough chronic for a few months since covid19 infection.  patient understands no acute findings and he must follow up with pulmonology and his pmd, dr. bennett, in the office.  patient also mentioned sometimes it burns when he urinates.  urine checked, no acute findings.  denies chance of std but sent culture anyway.  return precautuons reviewed -Luciano Fountain PA-C

## 2020-07-07 NOTE — ED ADULT NURSE NOTE - NSIMPLEMENTINTERV_GEN_ALL_ED
Implemented All Universal Safety Interventions:  [place] to call system. Call bell, personal items and telephone within reach. Instruct patient to call for assistance. Room bathroom lighting operational. Non-slip footwear when patient is off stretcher. Physically safe environment: no spills, clutter or unnecessary equipment. Stretcher in lowest position, wheels locked, appropriate side rails in place.

## 2020-07-07 NOTE — ED ADULT TRIAGE NOTE - LANGUAGE ASSISTANCE NEEDED
pt communicates well with nursing staff/No-Patient/Caregiver offered and refused free interpretation services.

## 2020-07-07 NOTE — ED ADULT NURSE NOTE - OBJECTIVE STATEMENT
pt presents to ED c/o constant chest pain o6kagny w/ SOB and dizziness in the morning starting 1wk ago. pr also states he has a dry cough in the morning that sometimes has blood in it. pt denies fevers at home, n/v, and HA. Pt reports he was seen by a cardiologist and told "he had something with his heart" but he never followed up or was told what it was. Pt AOx4, breathing symmetrical and unlabored, EKG completed, cardiac monitoring in place, #20 IV inserted into L. AC, bloods drawn and sent to lab, pt in no acute distress at this time. pt brought to CT scan on tele monitor for CTA at this time.

## 2020-07-07 NOTE — ED STATDOCS - LANGUAGE ASSISTANCE NEEDED
No-Patient/Caregiver offered and refused free interpretation services./pt communicates well with nursing staff

## 2020-07-07 NOTE — ED STATDOCS - CARE PROVIDER_API CALL
Jacobo Barnes  INTERNAL MEDICINE  [address] [place] [address]  Phone: [phone]  Fax: [phone]  Follow Up Time:

## 2020-07-07 NOTE — ED STATDOCS - OBJECTIVE STATEMENT
33 y/o M with no pertinent PMHx presenting to the ED c/o CP, SOB x1 week. Patient reports that he was tested for COVID x3 months ago that came back positive.  Since then patient has been having body aches, subjective fever.  Patient states that over the past week he has been having CP with associated SOB and hemoptysis and persistence of symptoms. Came to the ED for further evaluation.  [employer]  used: #[id number]

## 2020-07-08 LAB
CULTURE RESULTS: NO GROWTH — SIGNIFICANT CHANGE UP
SPECIMEN SOURCE: SIGNIFICANT CHANGE UP

## 2020-07-09 LAB
C TRACH RRNA SPEC QL NAA+PROBE: SIGNIFICANT CHANGE UP
N GONORRHOEA RRNA SPEC QL NAA+PROBE: SIGNIFICANT CHANGE UP
SPECIMEN SOURCE: SIGNIFICANT CHANGE UP

## 2020-07-15 ENCOUNTER — APPOINTMENT (OUTPATIENT)
Dept: CARDIOLOGY | Facility: HOSPITAL | Age: 35
End: 2020-07-15

## 2020-10-22 ENCOUNTER — EMERGENCY (EMERGENCY)
Facility: HOSPITAL | Age: 35
LOS: 0 days | Discharge: HOME | End: 2020-10-22
Attending: EMERGENCY MEDICINE
Payer: MEDICAID

## 2020-10-22 VITALS
DIASTOLIC BLOOD PRESSURE: 72 MMHG | HEIGHT: 65 IN | WEIGHT: 149.91 LBS | SYSTOLIC BLOOD PRESSURE: 109 MMHG | RESPIRATION RATE: 18 BRPM | TEMPERATURE: 99 F | HEART RATE: 92 BPM | OXYGEN SATURATION: 100 %

## 2020-10-22 DIAGNOSIS — H53.8 OTHER VISUAL DISTURBANCES: ICD-10-CM

## 2020-10-22 DIAGNOSIS — J34.89 OTHER SPECIFIED DISORDERS OF NOSE AND NASAL SINUSES: ICD-10-CM
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
## 2020-10-22 DIAGNOSIS — Z20.828 CONTACT WITH AND (SUSPECTED) EXPOSURE TO OTHER VIRAL COMMUNICABLE DISEASES: ICD-10-CM
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
## 2020-10-22 DIAGNOSIS — H11.31 CONJUNCTIVAL HEMORRHAGE, RIGHT EYE: ICD-10-CM

## 2020-10-22 DIAGNOSIS — S00.83XA CONTUSION OF OTHER PART OF HEAD, INITIAL ENCOUNTER: ICD-10-CM

## 2020-10-22 DIAGNOSIS — S00.33XA CONTUSION OF NOSE, INITIAL ENCOUNTER: ICD-10-CM

## 2020-10-22 DIAGNOSIS — S00.81XA ABRASION OF OTHER PART OF HEAD, INITIAL ENCOUNTER: ICD-10-CM

## 2020-10-22 LAB
ALBUMIN SERPL ELPH-MCNC: 3.9 G/DL — SIGNIFICANT CHANGE UP (ref 3.3–5)
ALP SERPL-CCNC: 83 U/L — SIGNIFICANT CHANGE UP (ref 40–120)
ALT FLD-CCNC: 64 U/L — SIGNIFICANT CHANGE UP (ref 12–78)
ANION GAP SERPL CALC-SCNC: 5 MMOL/L — SIGNIFICANT CHANGE UP (ref 5–17)
APAP SERPL-MCNC: < 2 UG/ML (ref 10–30)
APPEARANCE UR: CLEAR — SIGNIFICANT CHANGE UP
AST SERPL-CCNC: 55 U/L — HIGH (ref 15–37)
BASOPHILS # BLD AUTO: 0.01 K/UL — SIGNIFICANT CHANGE UP (ref 0–0.2)
BASOPHILS NFR BLD AUTO: 0.2 % — SIGNIFICANT CHANGE UP (ref 0–2)
BILIRUB SERPL-MCNC: 0.8 MG/DL — SIGNIFICANT CHANGE UP (ref 0.2–1.2)
BILIRUB UR-MCNC: NEGATIVE — SIGNIFICANT CHANGE UP
BUN SERPL-MCNC: 14 MG/DL — SIGNIFICANT CHANGE UP (ref 7–23)
CALCIUM SERPL-MCNC: 9.3 MG/DL — SIGNIFICANT CHANGE UP (ref 8.5–10.1)
CHLORIDE SERPL-SCNC: 109 MMOL/L — HIGH (ref 96–108)
CO2 SERPL-SCNC: 28 MMOL/L — SIGNIFICANT CHANGE UP (ref 22–31)
COLOR SPEC: YELLOW — SIGNIFICANT CHANGE UP
CREAT SERPL-MCNC: 0.99 MG/DL — SIGNIFICANT CHANGE UP (ref 0.5–1.3)
DIFF PNL FLD: NEGATIVE — SIGNIFICANT CHANGE UP
EOSINOPHIL # BLD AUTO: 0.01 K/UL — SIGNIFICANT CHANGE UP (ref 0–0.5)
EOSINOPHIL NFR BLD AUTO: 0.2 % — SIGNIFICANT CHANGE UP (ref 0–6)
GLUCOSE SERPL-MCNC: 103 MG/DL — HIGH (ref 70–99)
GLUCOSE UR QL: 250 MG/DL
HCT VFR BLD CALC: 45.1 % — SIGNIFICANT CHANGE UP (ref 39–50)
HGB BLD-MCNC: 15.2 G/DL — SIGNIFICANT CHANGE UP (ref 13–17)
IMM GRANULOCYTES NFR BLD AUTO: 0.2 % — SIGNIFICANT CHANGE UP (ref 0–1.5)
KETONES UR-MCNC: NEGATIVE — SIGNIFICANT CHANGE UP
LEUKOCYTE ESTERASE UR-ACNC: NEGATIVE — SIGNIFICANT CHANGE UP
LYMPHOCYTES # BLD AUTO: 0.81 K/UL — LOW (ref 1–3.3)
LYMPHOCYTES # BLD AUTO: 16.8 % — SIGNIFICANT CHANGE UP (ref 13–44)
MCHC RBC-ENTMCNC: 29.6 PG — SIGNIFICANT CHANGE UP (ref 27–34)
MCHC RBC-ENTMCNC: 33.7 GM/DL — SIGNIFICANT CHANGE UP (ref 32–36)
MCV RBC AUTO: 87.7 FL — SIGNIFICANT CHANGE UP (ref 80–100)
MONOCYTES # BLD AUTO: 0.42 K/UL — SIGNIFICANT CHANGE UP (ref 0–0.9)
MONOCYTES NFR BLD AUTO: 8.7 % — SIGNIFICANT CHANGE UP (ref 2–14)
NEUTROPHILS # BLD AUTO: 3.57 K/UL — SIGNIFICANT CHANGE UP (ref 1.8–7.4)
NEUTROPHILS NFR BLD AUTO: 73.9 % — SIGNIFICANT CHANGE UP (ref 43–77)
NITRITE UR-MCNC: NEGATIVE — SIGNIFICANT CHANGE UP
PH UR: 7 — SIGNIFICANT CHANGE UP (ref 5–8)
PLATELET # BLD AUTO: 256 K/UL — SIGNIFICANT CHANGE UP (ref 150–400)
POTASSIUM SERPL-MCNC: 4 MMOL/L — SIGNIFICANT CHANGE UP (ref 3.5–5.3)
POTASSIUM SERPL-SCNC: 4 MMOL/L — SIGNIFICANT CHANGE UP (ref 3.5–5.3)
PROT SERPL-MCNC: 7.8 GM/DL — SIGNIFICANT CHANGE UP (ref 6–8.3)
PROT UR-MCNC: NEGATIVE MG/DL — SIGNIFICANT CHANGE UP
RBC # BLD: 5.14 M/UL — SIGNIFICANT CHANGE UP (ref 4.2–5.8)
RBC # FLD: 11.9 % — SIGNIFICANT CHANGE UP (ref 10.3–14.5)
SALICYLATES SERPL-MCNC: <1.7 MG/DL — LOW (ref 2.8–20)
SARS-COV-2 RNA SPEC QL NAA+PROBE: SIGNIFICANT CHANGE UP
SODIUM SERPL-SCNC: 142 MMOL/L — SIGNIFICANT CHANGE UP (ref 135–145)
SP GR SPEC: 1 — LOW (ref 1.01–1.02)
UROBILINOGEN FLD QL: NEGATIVE MG/DL — SIGNIFICANT CHANGE UP
WBC # BLD: 4.83 K/UL — SIGNIFICANT CHANGE UP (ref 3.8–10.5)
WBC # FLD AUTO: 4.83 K/UL — SIGNIFICANT CHANGE UP (ref 3.8–10.5)

## 2020-10-22 PROCEDURE — 80053 COMPREHEN METABOLIC PANEL: CPT

## 2020-10-22 PROCEDURE — U0003: CPT

## 2020-10-22 PROCEDURE — 70486 CT MAXILLOFACIAL W/O DYE: CPT

## 2020-10-22 PROCEDURE — 70450 CT HEAD/BRAIN W/O DYE: CPT | Mod: 26

## 2020-10-22 PROCEDURE — 70450 CT HEAD/BRAIN W/O DYE: CPT

## 2020-10-22 PROCEDURE — 76376 3D RENDER W/INTRP POSTPROCES: CPT | Mod: 26

## 2020-10-22 PROCEDURE — 36415 COLL VENOUS BLD VENIPUNCTURE: CPT

## 2020-10-22 PROCEDURE — 99284 EMERGENCY DEPT VISIT MOD MDM: CPT | Mod: 25

## 2020-10-22 PROCEDURE — 85025 COMPLETE CBC W/AUTO DIFF WBC: CPT

## 2020-10-22 PROCEDURE — 81003 URINALYSIS AUTO W/O SCOPE: CPT

## 2020-10-22 PROCEDURE — 76376 3D RENDER W/INTRP POSTPROCES: CPT

## 2020-10-22 PROCEDURE — 99283 EMERGENCY DEPT VISIT LOW MDM: CPT

## 2020-10-22 PROCEDURE — 80307 DRUG TEST PRSMV CHEM ANLYZR: CPT

## 2020-10-22 PROCEDURE — 70486 CT MAXILLOFACIAL W/O DYE: CPT | Mod: 26
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
## 2020-10-22 NOTE — ED STATDOCS - PATIENT PORTAL LINK FT
You can access the FollowMyHealth Patient Portal offered by [facility] by registering at the following website: [email or web address]. By joining [employer]’s FollowMyHealth portal, you will also be able to view your health information using other applications (apps) compatible with our system.
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
## 2020-10-22 NOTE — ED STATDOCS - NSFOLLOWUPINSTRUCTIONS_ED_ALL_ED_FT
Head Injury, Adult    Hay muchos tipos de lesiones en la fernandez. Algunas pueden ser leves, hugo un chichón. Otras pueden ser más graves. Ejemplos de lesiones graves:  •Un golpe reinaldo en la fernandez que sacude el cerebro hacia rossi y atrás (conmoción cerebral).      •Un moretón (contusión) en el cerebro. Lasker significa que hay hemorragia en el cerebro que puede causar un edema.      •Fisura en el cráneo (fractura de cráneo).      •Hemorragia en el cerebro que se acumula, se espesa (se produce un coágulo) y forma un bulto (hematoma).      La mayoría de los problemas provocados por angeles lesión en la fernandez ocurren bridgett las primeras 24 horas. Sin embargo, es posible que siga teniendo efectos secundarios entre 7 y 10 días después de la lesión. Es importante controlar garcia afección para felix si hay cambios. Es posible que deban observarlo en el departamento de emergencias o en el servicio de atención urgente, o puede ser necesario que se quede en el hospital.      ¿Cuáles son las causas?  Hay muchas causas posibles de angeles lesión en la fernandez. Angeles lesión en la fernandez puede tener estas causas:  •Un accidente automovilístico.      •Accidentes en bicicleta o motocicleta.      •Lesiones deportivas.      •Caídas.        ¿Cuáles son los signos o los síntomas?  Los síntomas de lesión en la fernandez incluyen un moretón, un chichón o un sangrado en el lugar de la lesión. Otros síntomas físicos pueden ser:  •Dolor de fernandez.      •Malestar estomacal (náuseas) o vómitos.      •Mareos.      •Cansancio.      •Sentir incomodidad cerca de luces brillantes o ruidos guzman.      •Temblores que no puede controlar (convulsiones).      •Dificultad para despertarse.      •Desmayos.    Los síntomas mentales o emocionales pueden incluir los siguientes:  •Sentirse abrumado o malhumorado.      •Confusión y problemas de memoria.      •Tener problemas para prestar atención o concentrarse.      •Cambios en los hábitos de alimentación o en el sueño.      •Sentirse preocupado o nervioso (ansioso).      •Sentirse marichuy (deprimido).        ¿Cómo se trata?    El tratamiento de esta afección depende de la gravedad de la lesión y del tipo de lesión que sufrió. El objetivo principal es prevenir complicaciones y darle tiempo al cerebro para que se recupere.    Lesión de fernandez leve   Si usted sufre lesión leve en la fernandez, es posible que lo envíen a casa, y el tratamiento puede incluir lo siguiente:  •Estar en observación. Un adulto responsible debe quedarse con usted bridgett 24 horas después de producida la lesión y controlarlo con frecuencia.      •Conway físico.      •Conway cerebral.      •Analgésicos.      Lesión de fernandez grave  Si tiene angeles lesión grave en la fernandez, el tratamiento puede incluir lo siguiente:  •Estar en observación cercana. Lasker incluye hospitalización con exámenes físicos frecuentes.    •Medicamentos para:  •Ayudar a aliviar el dolor.      •Evitar los temblores que no se pueden controlar.      •Ayudar con la hinchazón del cerebro.        •El uso de angeles máquina que lo ayude a respirar (respirador).      •Tratamientos para controlar la hinchazón dentro del cerebro.    •Cirugía de cerebro. Esta puede ser necesaria en los siguientes casos:  •Extraer un coágulo de keli.      •Interrumpir el sangrado.      •Retirar angeles parte del cráneo. Lasker permite que el cerebro tenga lugar para hincharse.          Siga estas instrucciones en garcia casa:    Actividad     •Shannon reposo.      •Evite las actividades difíciles o cansadoras.      •Asegúrese de dormir lo suficiente.    •Limite las actividades que requieran pensar mucho o prestar mucha atención, hugo las siguientes:  •Mirar televisión.      •Jugar juegos de memoria y armar rompecabezas.      •Tareas para el hogar o trabajos relacionados con el empleo.      •Trabajar en la computadora, participar en redes sociales y enviar mensajes de texto.        •Evite las actividades que pudieran provocar otra lesión en la fernandez hasta que el médico lo autorice. Entre ellas, practicar deportes. Puede ser peligroso tener otra lesión en la fernandez antes de que se haya recuperado de la primera.      •Pregunte al médico cuándo puede regresar a las actividades normales sin riesgo, hugo al trabajo o a la escuela. Pida al médico un plan detallado para volver a realizar las actividades habituales de manera progresiva.      •Consulte a garcia médico cuándo puede conducir, andar en bicicleta o usar maquinaria pesada. No realice estas actividades si se siente mareado.        Estilo de jay      • No lion alcohol hasta que el médico lo autorice.      • No consuma drogas.      •Si le resulta más difícil que lo habitual recordar las cosas, escríbalas.      •Trate de hacer angeles cosa por vez si se distrae con facilidad.      •Consulte con familiares y amigos si debe brenton decisiones importantes.      •Cuénteles a kathleen amigos, familiares, colegas de confianza y garcia gerente en el trabajo sobre garcia lesión, los síntomas y kathleen límites (restricciones). Pídales que observen si aparecen nuevos problemas o empeoran los existentes.      Instrucciones generales     •Gem los medicamentos de venta azeb y los recetados solamente hugo se lo haya indicado el médico.      •Pídale a alguien que lo acompañe bridgett 24 horas después de la lesión en la fernandez. Esta persona debe observar si hay cambios en los síntomas y estar preparada para obtener ayuda.      •Concurra a todas las visitas de seguimiento hugo se lo haya indicado el médico. Lasker es importante.      ¿Cómo se cl?     •Trabaje en garcia equilibrio y garcia fuerza. Lasker puede ayudarlo a evitar caídas.      •Use el cinturón de seguridad cuando se encuentre en un vehículo en movimiento.    •Use un blaise cuando realice las siguientes actividades:  •Dax en bicicleta.      •Esquiar.      •Practicar algún otro deporte o actividad que representen un riesgo de lesión.      •Si arabella alcohol:•Limite la cantidad que arabella:  •De 0 a 1 medida por día para las mujeres.      •De 0 a 2 medidas por día para los hombres.        •Esté atento a la cantidad de alcohol que hay en las bebidas que rae. En los [place], angeles medida equivale a angeles botella de cerveza de 12 oz (355 ml), un vaso de vino de 5 oz (148 ml) o un vaso de angeles bebida alcohólica de juvenal graduación de 1½ oz (44 ml).      •Shannon de garcia hogar un lugar más seguro:  •Deshacerse de los obstáculos en pisos y escaleras. Lasker incluye las cosas que pueden hacer que se tropiece.      •Coloque barras para sostén en los fang y pasamanos en las escaleras.      •Ponga alfombras antideslizantes en pisos y bañeras.      •Mejore la iluminación de las zonas oscuras.          Solicite ayuda inmediatamente si:  •Tiene lo siguiente:  •Dolor de fernandez muy reinaldo que no se calma con medicamentos.      •Dificultad para caminar o debilidad en los brazos o las piernas.      •Secreción transparente o con keli que proviene de la nariz o de los oídos.      •Cambios en la forma de felix (visión).      •Temblores que no puede controlar.        •Pierde el equilibrio.      •Vomita.      •La parte central queenie de los ojos (pupila) cambia de tamaño.      •Arrastra las palabras.      •Garcia mareo empeora.      •Se desmaya.      •Está más somnoliento de lo normal o tiene dificultad para mantenerse despierto.      •Kathleen síntomas empeoran.      Estos síntomas pueden indicar angeles emergencia. No espere a felix si los síntomas desaparecen. Solicite atención médica de inmediato. Comuníquese con el servicio de emergencias de garcia localidad (911 en los [place]). No conduzca por kathleen propios medios hasta el hospital.       Resumen    •Hay muchos tipos de lesiones en la fernandez. Algunas pueden ser leves, hugo un chichón. Otras pueden ser más graves      •El tratamiento de esta afección depende de la gravedad de la lesión y del tipo de lesión que sufrió.      •Pregunte al médico cuándo puede regresar a las actividades normales sin riesgo, hugo al trabajo o a la escuela.      •Para evitar angeles lesión en la fernandez, use cinturón de seguridad cuando se desplace en automóvil, use blaise cuando monte en bicicleta, limite el consumo de alcohol y shannon que garcia hogar sea más seguro.      Esta información no tiene hugo fin reemplazar el consejo del médico. Asegúrese de hacerle al médico cualquier pregunta que tenga.    Rest. Tylenol 650 mg. PO every 4 hrs. for pain. Follow up with PMD.

## 2020-10-22 NOTE — ED STATDOCS - ENMT, MLM
Nasal mucosa clear. Swelling and bruising to nose.  Mouth with normal mucosa. No malocclusion. Throat has no vesicles, no oropharyngeal exudates and uvula is midline.
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
## 2020-10-22 NOTE — ED STATDOCS - EYES, MLM
clear bilaterally.  Pupils equal, round, and reactive to light. Small subconjunctival hemorrhage right eye.  EOMI.

## 2020-10-22 NOTE — ED ADULT TRIAGE NOTE - NSWEIGHTCALCTOOLDRUG_GEN_A_CORE
Relevant Problems   No relevant active problems       Anesthetic History   No history of anesthetic complications            Review of Systems / Medical History  Patient summary reviewed, nursing notes reviewed and pertinent labs reviewed    Pulmonary  Within defined limits                 Neuro/Psych       CVA: no residual symptoms  TIA     Cardiovascular    Hypertension: poorly controlled          Past MI, CAD and PAD    Exercise tolerance: <4 METS     GI/Hepatic/Renal     GERD: well controlled           Endo/Other    Diabetes: poorly controlled, type 2, using insulin  Hypothyroidism: well controlled  Arthritis     Other Findings              Physical Exam    Airway  Mallampati: III  TM Distance: < 4 cm  Neck ROM: decreased range of motion   Mouth opening: Diminished (comment)     Cardiovascular  Regular rate and rhythm,  S1 and S2 normal,  no murmur, click, rub, or gallop  Rhythm: regular  Rate: normal         Dental    Dentition: Poor dentition     Pulmonary  Breath sounds clear to auscultation               Abdominal  Abdominal exam normal       Other Findings            Anesthetic Plan    ASA: 3  Anesthesia type: MAC and general          Induction: Intravenous  Anesthetic plan and risks discussed with: Patient
 used

## 2020-12-23 ENCOUNTER — EMERGENCY (EMERGENCY)
Facility: HOSPITAL | Age: 35
LOS: 0 days | Discharge: HOME | End: 2020-12-23
Attending: EMERGENCY MEDICINE
Payer: MEDICAID

## 2020-12-23 VITALS
SYSTOLIC BLOOD PRESSURE: 111 MMHG | OXYGEN SATURATION: 99 % | RESPIRATION RATE: 16 BRPM | HEART RATE: 62 BPM | TEMPERATURE: 98 F | DIASTOLIC BLOOD PRESSURE: 70 MMHG

## 2020-12-23 VITALS — WEIGHT: 141.98 LBS | HEIGHT: 65 IN

## 2020-12-23 DIAGNOSIS — S66.396A: ICD-10-CM
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
## 2020-12-23 DIAGNOSIS — M20.011 MALLET FINGER OF RIGHT FINGER(S): ICD-10-CM

## 2020-12-23 DIAGNOSIS — S13.9XXA SPRAIN OF JOINTS AND LIGAMENTS OF UNSPECIFIED PARTS OF NECK, INITIAL ENCOUNTER: ICD-10-CM

## 2020-12-23 DIAGNOSIS — S20.211A CONTUSION OF RIGHT FRONT WALL OF THORAX, INITIAL ENCOUNTER: ICD-10-CM
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
## 2020-12-23 DIAGNOSIS — S80.01XA CONTUSION OF RIGHT KNEE, INITIAL ENCOUNTER: ICD-10-CM

## 2020-12-23 LAB
ALBUMIN SERPL ELPH-MCNC: 4.1 G/DL — SIGNIFICANT CHANGE UP (ref 3.3–5)
ALP SERPL-CCNC: 83 U/L — SIGNIFICANT CHANGE UP (ref 40–120)
ALT FLD-CCNC: 57 U/L — SIGNIFICANT CHANGE UP (ref 12–78)
ANION GAP SERPL CALC-SCNC: 3 MMOL/L — LOW (ref 5–17)
APTT BLD: 35.8 SEC — HIGH (ref 27.5–35.5)
AST SERPL-CCNC: 26 U/L — SIGNIFICANT CHANGE UP (ref 15–37)
BASOPHILS # BLD AUTO: 0.02 K/UL — SIGNIFICANT CHANGE UP (ref 0–0.2)
BASOPHILS NFR BLD AUTO: 0.5 % — SIGNIFICANT CHANGE UP (ref 0–2)
BILIRUB SERPL-MCNC: 0.8 MG/DL — SIGNIFICANT CHANGE UP (ref 0.2–1.2)
BUN SERPL-MCNC: 10 MG/DL — SIGNIFICANT CHANGE UP (ref 7–23)
CALCIUM SERPL-MCNC: 9.4 MG/DL — SIGNIFICANT CHANGE UP (ref 8.5–10.1)
CHLORIDE SERPL-SCNC: 107 MMOL/L — SIGNIFICANT CHANGE UP (ref 96–108)
CO2 SERPL-SCNC: 30 MMOL/L — SIGNIFICANT CHANGE UP (ref 22–31)
CREAT SERPL-MCNC: 0.76 MG/DL — SIGNIFICANT CHANGE UP (ref 0.5–1.3)
EOSINOPHIL # BLD AUTO: 0.06 K/UL — SIGNIFICANT CHANGE UP (ref 0–0.5)
EOSINOPHIL NFR BLD AUTO: 1.4 % — SIGNIFICANT CHANGE UP (ref 0–6)
GLUCOSE SERPL-MCNC: 97 MG/DL — SIGNIFICANT CHANGE UP (ref 70–99)
HCT VFR BLD CALC: 46.6 % — SIGNIFICANT CHANGE UP (ref 39–50)
HGB BLD-MCNC: 15.4 G/DL — SIGNIFICANT CHANGE UP (ref 13–17)
IMM GRANULOCYTES NFR BLD AUTO: 0.2 % — SIGNIFICANT CHANGE UP (ref 0–1.5)
INR BLD: 1.03 RATIO — SIGNIFICANT CHANGE UP (ref 0.88–1.16)
LIDOCAIN IGE QN: 140 U/L — SIGNIFICANT CHANGE UP (ref 73–393)
LYMPHOCYTES # BLD AUTO: 1.25 K/UL — SIGNIFICANT CHANGE UP (ref 1–3.3)
LYMPHOCYTES # BLD AUTO: 28.7 % — SIGNIFICANT CHANGE UP (ref 13–44)
MCHC RBC-ENTMCNC: 29.6 PG — SIGNIFICANT CHANGE UP (ref 27–34)
MCHC RBC-ENTMCNC: 33 GM/DL — SIGNIFICANT CHANGE UP (ref 32–36)
MCV RBC AUTO: 89.6 FL — SIGNIFICANT CHANGE UP (ref 80–100)
MONOCYTES # BLD AUTO: 0.28 K/UL — SIGNIFICANT CHANGE UP (ref 0–0.9)
MONOCYTES NFR BLD AUTO: 6.4 % — SIGNIFICANT CHANGE UP (ref 2–14)
NEUTROPHILS # BLD AUTO: 2.73 K/UL — SIGNIFICANT CHANGE UP (ref 1.8–7.4)
NEUTROPHILS NFR BLD AUTO: 62.8 % — SIGNIFICANT CHANGE UP (ref 43–77)
PLATELET # BLD AUTO: 268 K/UL — SIGNIFICANT CHANGE UP (ref 150–400)
POTASSIUM SERPL-MCNC: 4.4 MMOL/L — SIGNIFICANT CHANGE UP (ref 3.5–5.3)
POTASSIUM SERPL-SCNC: 4.4 MMOL/L — SIGNIFICANT CHANGE UP (ref 3.5–5.3)
PROT SERPL-MCNC: 8.4 GM/DL — HIGH (ref 6–8.3)
PROTHROM AB SERPL-ACNC: 12 SEC — SIGNIFICANT CHANGE UP (ref 10.6–13.6)
RBC # BLD: 5.2 M/UL — SIGNIFICANT CHANGE UP (ref 4.2–5.8)
RBC # FLD: 12 % — SIGNIFICANT CHANGE UP (ref 10.3–14.5)
SODIUM SERPL-SCNC: 140 MMOL/L — SIGNIFICANT CHANGE UP (ref 135–145)
WBC # BLD: 4.35 K/UL — SIGNIFICANT CHANGE UP (ref 3.8–10.5)
WBC # FLD AUTO: 4.35 K/UL — SIGNIFICANT CHANGE UP (ref 3.8–10.5)

## 2020-12-23 PROCEDURE — 96375 TX/PRO/DX INJ NEW DRUG ADDON: CPT | Mod: XU

## 2020-12-23 PROCEDURE — 73562 X-RAY EXAM OF KNEE 3: CPT | Mod: RT

## 2020-12-23 PROCEDURE — 73130 X-RAY EXAM OF HAND: CPT | Mod: RT

## 2020-12-23 PROCEDURE — 73130 X-RAY EXAM OF HAND: CPT | Mod: 26,RT

## 2020-12-23 PROCEDURE — 36415 COLL VENOUS BLD VENIPUNCTURE: CPT

## 2020-12-23 PROCEDURE — 74177 CT ABD & PELVIS W/CONTRAST: CPT | Mod: 26

## 2020-12-23 PROCEDURE — 73562 X-RAY EXAM OF KNEE 3: CPT | Mod: 26,RT

## 2020-12-23 PROCEDURE — 96374 THER/PROPH/DIAG INJ IV PUSH: CPT | Mod: XU

## 2020-12-23 PROCEDURE — 83690 ASSAY OF LIPASE: CPT

## 2020-12-23 PROCEDURE — 73140 X-RAY EXAM OF FINGER(S): CPT | Mod: XU,RT

## 2020-12-23 PROCEDURE — 85730 THROMBOPLASTIN TIME PARTIAL: CPT

## 2020-12-23 PROCEDURE — 85025 COMPLETE CBC W/AUTO DIFF WBC: CPT

## 2020-12-23 PROCEDURE — 73140 X-RAY EXAM OF FINGER(S): CPT | Mod: 26,59,RT

## 2020-12-23 PROCEDURE — 86901 BLOOD TYPING SEROLOGIC RH(D): CPT

## 2020-12-23 PROCEDURE — 72170 X-RAY EXAM OF PELVIS: CPT

## 2020-12-23 PROCEDURE — 72125 CT NECK SPINE W/O DYE: CPT | Mod: 26

## 2020-12-23 PROCEDURE — 29130 APPL FINGER SPLINT STATIC: CPT | Mod: F9

## 2020-12-23 PROCEDURE — 71260 CT THORAX DX C+: CPT | Mod: 26

## 2020-12-23 PROCEDURE — 99284 EMERGENCY DEPT VISIT MOD MDM: CPT | Mod: 25

## 2020-12-23 PROCEDURE — 70450 CT HEAD/BRAIN W/O DYE: CPT | Mod: 26

## 2020-12-23 PROCEDURE — 71260 CT THORAX DX C+: CPT

## 2020-12-23 PROCEDURE — 72170 X-RAY EXAM OF PELVIS: CPT | Mod: 26

## 2020-12-23 PROCEDURE — 86850 RBC ANTIBODY SCREEN: CPT

## 2020-12-23 PROCEDURE — 80053 COMPREHEN METABOLIC PANEL: CPT

## 2020-12-23 PROCEDURE — 99284 EMERGENCY DEPT VISIT MOD MDM: CPT

## 2020-12-23 PROCEDURE — 86900 BLOOD TYPING SEROLOGIC ABO: CPT

## 2020-12-23 PROCEDURE — 70450 CT HEAD/BRAIN W/O DYE: CPT

## 2020-12-23 PROCEDURE — 85610 PROTHROMBIN TIME: CPT

## 2020-12-23 PROCEDURE — 72125 CT NECK SPINE W/O DYE: CPT

## 2020-12-23 PROCEDURE — 74177 CT ABD & PELVIS W/CONTRAST: CPT

## 2020-12-23 RX ORDER — KETOROLAC TROMETHAMINE 30 MG/ML
30 SYRINGE (ML) INJECTION ONCE
Refills: 0 | Status: DISCONTINUED | OUTPATIENT
Start: 2020-12-23 | End: 2020-12-23

## 2020-12-23 RX ORDER — SODIUM CHLORIDE 9 MG/ML
1000 INJECTION INTRAMUSCULAR; INTRAVENOUS; SUBCUTANEOUS ONCE
Refills: 0 | Status: COMPLETED | OUTPATIENT
Start: 2020-12-23 | End: 2020-12-23

## 2020-12-23 RX ORDER — ONDANSETRON 8 MG/1
4 TABLET, FILM COATED ORAL ONCE
Refills: 0 | Status: COMPLETED | OUTPATIENT
Start: 2020-12-23 | End: 2020-12-23

## 2020-12-23 RX ADMIN — SODIUM CHLORIDE 1000 MILLILITER(S): 9 INJECTION INTRAMUSCULAR; INTRAVENOUS; SUBCUTANEOUS at 17:00

## 2020-12-23 RX ADMIN — Medication 30 MILLIGRAM(S): at 17:07

## 2020-12-23 RX ADMIN — ONDANSETRON 4 MILLIGRAM(S): 8 TABLET, FILM COATED ORAL at 17:07
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
## 2020-12-23 NOTE — ED STATDOCS - NSFOLLOWUPINSTRUCTIONS_ED_ALL_ED_FT
Tendon Rupture    AMBULATORY CARE:    A tendon rupture is a partial or complete tear of your tendon. Tendons are tough bands of tissue that attach your muscles to your bones. A tear may be caused by an injury or increased pressure on the tendon that occurs during sports or a fall. Your risk may be higher if you have a weak tendon. Weak tendons may be caused by tendonitis, use of steroids, older age, and chronic conditions such as arthritis.     Common symptoms include the following:   •Tearing or popping sound at the time of the injury      •Pain or tenderness in the area of the ruptured tendon      •Weakness or stiffness in the injured area      •Swelling       •Bruising      •Trouble walking or moving the area where the tendon rupture occurred      Seek care immediately if:   •You have severe pain in the injured area, even after you take medicine.      •Your arm or leg feels warm, tender, and painful. It may look swollen and red.      •You feel lightheaded, short of breath, and have chest pain.      •You cough up blood.      Contact your healthcare provider if:   •Your symptoms do not get better with treatment.      •You feel another pop, snap, or crack in your tendon.       •You have questions or concerns about your condition or care.       Treatment for a tendon rupture depends on which tendon you ruptured and how severe the rupture is. You may need any of the following:  •NSAIDs, such as ibuprofen, help decrease swelling, pain, and fever. This medicine is available with or without a doctor's order. NSAIDs can cause stomach bleeding or kidney problems in certain people. If you take blood thinner medicine, always ask your healthcare provider if NSAIDs are safe for you. Always read the medicine label and follow directions.      •Acetaminophen decreases pain. It is available without a doctor's order. Ask how much to take and how often to take it. Follow directions. Acetaminophen can cause liver damage if not taken correctly.       •A steroid injection decreases pain, inflammation, and helps heal a partial tear.      •Support devices, such as a brace, cast, or splint, limit movement and protect your tendon. If the tendon rupture is in your leg, you may need to use crutches. This will decrease pain as you move around.       •Physical therapy may be recommended after swelling and pain have decreased. A physical therapist teaches you exercises to help improve movement and strength.      •Surgery may be needed to reattach your tendon to the bone if you have a complete tear.       Manage a tendon rupture:   •Rest the injured tendon until pain and swelling have decreased. Ask your healthcare provider what activities you can do while your tendon heals.      •Apply ice on your tendon for 15 to 20 minutes every hour for 48 hours or as directed. Use an ice pack, or put crushed ice in a plastic bag. Cover it with a towel. Ice helps prevent tissue damage and decreases swelling and pain.       •Compress the injury with an elastic bandage, air cast, medical boot, or splint to reduce swelling. Ask your healthcare provider which compression device to use, and how tight it should be.      •Elevate the injured area above the level of your heart as often as you can. This will help decrease swelling and pain. If possible, prop the injured area on pillows or blankets to keep it elevated comfortably.       Follow up with your healthcare provider as directed: Write down your questions so you remember to ask them during your visits.        © Copyright [employer] 2020
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
## 2020-12-23 NOTE — ED STATDOCS - CARE PROVIDER_API CALL
Klaus Wick)  Orthopaedic Surgery; Surgery of the Hand  [address]  [place] [address]  Phone: [phone]  Fax: [phone]  Follow Up Time:
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
## 2020-12-23 NOTE — ED STATDOCS - PATIENT PORTAL LINK FT
You can access the FollowMyHealth Patient Portal offered by [facility] by registering at the following website: [email or web address]. By joining [employer]’s FollowMyHealth portal, you will also be able to view your health information using other applications (apps) compatible with our system.
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
## 2020-12-23 NOTE — ED STATDOCS - MUSCULOSKELETAL, MLM
range of motion is not limited +neck +midline tenderness without deformity. +trapezius tenderness. +lumbar tenderness, midline without deformiy. +right paravertebral muscle tenderness. +right rib tenderness without chest wall deformity. Pelvis non-tender and stable.  MAEx4, b\l SLR 45 degrees, AFROM large joints, no obvious swelling or deformities. Right knee stable. No obvious effusion. Right fifth digit +Boutonniere deformity, tender. +decreased sensation to right pinky finger, cap refill < 2 seconds.

## 2020-12-23 NOTE — CONSULT NOTE ADULT - ASSESSMENT
A/P 35yMale with right injury to the little finger with fixed flexion deformity of DIP Joint, likely central slip disruption  -Dorsal aluminum splint placed to little finger, wrapped   -WBAT RUE   -Rest ice elevate  -Pain control  -No acute orthopaedic surgical intervention at this time  -Discussed need for possible surgical intervention  -Follow up with Dr. Wick with in the office in 3-5 days call for appt.   -Ortho stable for DC

## 2020-12-23 NOTE — ED STATDOCS - ENMT, MLM
Nasal mucosa clear.  Mouth with normal mucosa  Throat has no vesicles, no oropharyngeal exudates and uvula is midline. -manning sign
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
## 2021-09-26 ENCOUNTER — EMERGENCY (EMERGENCY)
Facility: HOSPITAL | Age: 36
LOS: 0 days | Discharge: HOME | End: 2021-09-26
Attending: EMERGENCY MEDICINE
Payer: MEDICAID

## 2021-09-26 VITALS
SYSTOLIC BLOOD PRESSURE: 114 MMHG | HEART RATE: 79 BPM | OXYGEN SATURATION: 100 % | WEIGHT: 175.05 LBS | TEMPERATURE: 99 F | RESPIRATION RATE: 18 BRPM | DIASTOLIC BLOOD PRESSURE: 78 MMHG | HEIGHT: 67 IN

## 2021-09-26 DIAGNOSIS — L29.9 PRURITUS, UNSPECIFIED: ICD-10-CM

## 2021-09-26 LAB
ALBUMIN SERPL ELPH-MCNC: 3.9 G/DL — SIGNIFICANT CHANGE UP (ref 3.3–5)
ALP SERPL-CCNC: 66 U/L — SIGNIFICANT CHANGE UP (ref 40–120)
ALT FLD-CCNC: 49 U/L — SIGNIFICANT CHANGE UP (ref 12–78)
ANION GAP SERPL CALC-SCNC: 5 MMOL/L — SIGNIFICANT CHANGE UP (ref 5–17)
AST SERPL-CCNC: 25 U/L — SIGNIFICANT CHANGE UP (ref 15–37)
BASOPHILS # BLD AUTO: 0.01 K/UL — SIGNIFICANT CHANGE UP (ref 0–0.2)
BASOPHILS NFR BLD AUTO: 0.3 % — SIGNIFICANT CHANGE UP (ref 0–2)
BILIRUB SERPL-MCNC: 0.8 MG/DL — SIGNIFICANT CHANGE UP (ref 0.2–1.2)
BUN SERPL-MCNC: 16 MG/DL — SIGNIFICANT CHANGE UP (ref 7–23)
CALCIUM SERPL-MCNC: 8.9 MG/DL — SIGNIFICANT CHANGE UP (ref 8.5–10.1)
CHLORIDE SERPL-SCNC: 107 MMOL/L — SIGNIFICANT CHANGE UP (ref 96–108)
CO2 SERPL-SCNC: 27 MMOL/L — SIGNIFICANT CHANGE UP (ref 22–31)
CREAT SERPL-MCNC: 0.73 MG/DL — SIGNIFICANT CHANGE UP (ref 0.5–1.3)
EOSINOPHIL # BLD AUTO: 0.08 K/UL — SIGNIFICANT CHANGE UP (ref 0–0.5)
EOSINOPHIL NFR BLD AUTO: 2.1 % — SIGNIFICANT CHANGE UP (ref 0–6)
GLUCOSE SERPL-MCNC: 104 MG/DL — HIGH (ref 70–99)
HCT VFR BLD CALC: 43 % — SIGNIFICANT CHANGE UP (ref 39–50)
HGB BLD-MCNC: 15 G/DL — SIGNIFICANT CHANGE UP (ref 13–17)
IMM GRANULOCYTES NFR BLD AUTO: 0.3 % — SIGNIFICANT CHANGE UP (ref 0–1.5)
LYMPHOCYTES # BLD AUTO: 1.02 K/UL — SIGNIFICANT CHANGE UP (ref 1–3.3)
LYMPHOCYTES # BLD AUTO: 27.3 % — SIGNIFICANT CHANGE UP (ref 13–44)
MCHC RBC-ENTMCNC: 30.3 PG — SIGNIFICANT CHANGE UP (ref 27–34)
MCHC RBC-ENTMCNC: 34.9 GM/DL — SIGNIFICANT CHANGE UP (ref 32–36)
MCV RBC AUTO: 86.9 FL — SIGNIFICANT CHANGE UP (ref 80–100)
MONOCYTES # BLD AUTO: 0.29 K/UL — SIGNIFICANT CHANGE UP (ref 0–0.9)
MONOCYTES NFR BLD AUTO: 7.8 % — SIGNIFICANT CHANGE UP (ref 2–14)
NEUTROPHILS # BLD AUTO: 2.33 K/UL — SIGNIFICANT CHANGE UP (ref 1.8–7.4)
NEUTROPHILS NFR BLD AUTO: 62.2 % — SIGNIFICANT CHANGE UP (ref 43–77)
PLATELET # BLD AUTO: 217 K/UL — SIGNIFICANT CHANGE UP (ref 150–400)
POTASSIUM SERPL-MCNC: 3.5 MMOL/L — SIGNIFICANT CHANGE UP (ref 3.5–5.3)
POTASSIUM SERPL-SCNC: 3.5 MMOL/L — SIGNIFICANT CHANGE UP (ref 3.5–5.3)
PROT SERPL-MCNC: 7.3 GM/DL — SIGNIFICANT CHANGE UP (ref 6–8.3)
RBC # BLD: 4.95 M/UL — SIGNIFICANT CHANGE UP (ref 4.2–5.8)
RBC # FLD: 12.2 % — SIGNIFICANT CHANGE UP (ref 10.3–14.5)
SODIUM SERPL-SCNC: 139 MMOL/L — SIGNIFICANT CHANGE UP (ref 135–145)
WBC # BLD: 3.74 K/UL — LOW (ref 3.8–10.5)
WBC # FLD AUTO: 3.74 K/UL — LOW (ref 3.8–10.5)

## 2021-09-26 PROCEDURE — 99283 EMERGENCY DEPT VISIT LOW MDM: CPT

## 2021-09-26 PROCEDURE — 99284 EMERGENCY DEPT VISIT MOD MDM: CPT

## 2021-09-26 PROCEDURE — 85025 COMPLETE CBC W/AUTO DIFF WBC: CPT

## 2021-09-26 PROCEDURE — 86780 TREPONEMA PALLIDUM: CPT

## 2021-09-26 PROCEDURE — 36415 COLL VENOUS BLD VENIPUNCTURE: CPT

## 2021-09-26 PROCEDURE — 80053 COMPREHEN METABOLIC PANEL: CPT

## 2021-09-26 RX ADMIN — Medication 60 MILLIGRAM(S): at 07:46

## 2021-09-26 NOTE — ED PROVIDER NOTE - NSFOLLOWUPINSTRUCTIONS_ED_ALL_ED_FT
Please call and follow up with your doctor in 1-3 days.  Please call [phone] to find doctors in [facility].    Use prednisone taper.  Take benadryl 50 mg every 6 hours for itch.      Prurito    LO QUE NECESITA SABER:    ¿Qué sumit saber sobre el prurito?El prurito o comezón de la piel puede interferir con da tareas diarias y el sueño. Es importante que reciba tratamiento, ya que se puede lastimar la piel y da probabilidades de contraer angeles infección aumentan si se rasca constantemente.    ¿Cuáles son las causas del prurito?  •Piel seca o condiciones de la piel, hugo dermatitis y psoriasis      •Angeles reacción a un nuevo medicamento o alimento      •Ciertas condiciones de sammy, hugo angeles enfermedad renal, hepática, de la tiroides, esclerosis múltiple y diabetes      •Embarazo      •Infecciones virales, hugo las paperas y la varicela      •Cáncer o tratamientos para el cáncer, hugo quimioterapia y radioterapia      •Picaduras de insectos u otros problemas con la piel      ¿Cómo se diagnostica el prurito?Garcia médico le preguntará acerca de da síntomas. Le preguntará si ha probado algún nuevo alimento, crema o cosmético en forma reciente. Le preguntará si ha valdez de viaje o si ha estado con personas o animales enfermos o que se rascaban. Garcia médico le examinará la piel, el abdomen y los ganglios linfáticos. Es posible que deba hacerse análisis de keli o que le tomen angeles muestra de piel para comprobar si tiene angeles infección u otras posibles causas de la comezón.    ¿Cómo se trata el prurito?El tratamiento dependerá de la causa de la comezón. Si la causa es angeles condición de sammy, deberá recibir tratamiento para forest condición. El tratamiento puede llegar a incluir cualquiera de los siguientes:   •Los medicamentospodrían contribuir a aliviar el prurito o la inflamación. Las cremas para la piel, hugo las cremas con esteroides y las cremas contra prurito, también pueden aliviar la comezón.      •Fototerapia, o terapia con kee, puede aliviar la comezón.      ¿Cómo puedo controlar el prurito?  •[place] duchas cortas con agua tibia.No se duche con [place]. Use solamente angeles pequeña cantidad de jabón suave para limpiarse la piel.      •Aplíquese angeles crema humectante o refrescantedespués de bañarse y a lo eladio del día.      •Use un humidificador de jadiel fríopara humectar el aire de garcia hogar y mantener baja la temperatura. El aire húmedo y fresco puede aliviar la comezón y sequedad de la piel.      •Evite el uso de alérgenos y productos que irritan la piel.No use perfume, suavizante para la ropa o maquillaje que le irrite la piel. Lave garcia ropa con un detergente suave. Vístase con prendas de ropa suelta de algodón y opte por las sábanas de algodón. Evite la ropa de darrel.      ¿Cuándo sumit comunicarme con mi médico?  •Garcia comezón no mejora o empeora.      •Garcia piel se ha puesto dionna o inflamada de tanto rascarse.      •Usted tiene nuevos síntomas, hugo pérdida de peso, cansancio, cambios en garcia orina o fiebre.      •Usted tiene preguntas o inquietudes acerca de garcia condición o cuidado.      ACUERDOS SOBRE GARCIA CUIDADO:    Usted tiene el derecho de ayudar a planear garcia cuidado. Aprenda todo lo que pueda sobre garcia condición y hguo darle tratamiento. Discuta da opciones de tratamiento con da médicos para decidir el cuidado que usted desea recibir. Usted siempre tiene el derecho de rechazar el tratamiento.

## 2021-09-26 NOTE — ED PROVIDER NOTE - CLINICAL SUMMARY MEDICAL DECISION MAKING FREE TEXT BOX
36 yo pt presents to ED for itching and no rash.  Plan check labs. 36 yo pt presents to ED for itching and no rash.  Plan check labs.  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

## 2021-09-26 NOTE — ED ADULT NURSE NOTE - OBJECTIVE STATEMENT
pt p/w c/o generalized pruritis "at all times."  Pt states he has no other s/s including chest pain, shortness of breath, fever, n/v/d.

## 2021-09-26 NOTE — ED ADULT NURSE NOTE - NSIMPLEMENTINTERV_GEN_ALL_ED
Implemented All Universal Safety Interventions:  [place] to call system. Call bell, personal items and telephone within reach. Instruct patient to call for assistance. Room bathroom lighting operational. Non-slip footwear when patient is off stretcher. Physically safe environment: no spills, clutter or unnecessary equipment. Stretcher in lowest position, wheels locked, appropriate side rails in place.

## 2021-09-26 NOTE — ED PROVIDER NOTE - OBJECTIVE STATEMENT
[id number]  uses. 34 yo pt presents to ED for itching.  pt started with itching ~2 weeks ago.  Itching arms, torso, and legs.  pt states he never had any skin lesions/rash.  Pt does work landscaping.  pt states he took benadryl at home without relief.  Itching not improving and came to ED.  No travel, no sick contact.  Was told that he had  a fatty liver.  Pt was sexually active with new partner 1 month ago.  No fever, no painful urination or penile discharge.

## 2021-09-26 NOTE — ED ADULT TRIAGE NOTE - CHIEF COMPLAINT QUOTE
Pt c/o of itching to extremities and torso.  States he has been itchy x2 weeks, but states the rash has never been visible.  No redness, no hives.  Has been taking benadryl with no relief.

## 2021-09-26 NOTE — ED PROVIDER NOTE - PATIENT PORTAL LINK FT
You can access the FollowMyHealth Patient Portal offered by [facility] by registering at the following website: [email or web address]. By joining [employer]’s FollowMyHealth portal, you will also be able to view your health information using other applications (apps) compatible with our system.

## 2021-09-27 LAB — T PALLIDUM AB TITR SER: NEGATIVE — SIGNIFICANT CHANGE UP

## 2021-10-08 ENCOUNTER — EMERGENCY (EMERGENCY)
Facility: HOSPITAL | Age: 36
LOS: 0 days | Discharge: HOME | End: 2021-10-08
Attending: EMERGENCY MEDICINE
Payer: MEDICAID

## 2021-10-08 VITALS
HEART RATE: 55 BPM | TEMPERATURE: 98 F | RESPIRATION RATE: 16 BRPM | SYSTOLIC BLOOD PRESSURE: 113 MMHG | DIASTOLIC BLOOD PRESSURE: 75 MMHG | OXYGEN SATURATION: 98 %

## 2021-10-08 VITALS — HEIGHT: 65 IN | WEIGHT: 139.99 LBS

## 2021-10-08 DIAGNOSIS — R21 RASH AND OTHER NONSPECIFIC SKIN ERUPTION: ICD-10-CM

## 2021-10-08 PROCEDURE — 99283 EMERGENCY DEPT VISIT LOW MDM: CPT

## 2021-10-08 RX ORDER — FAMOTIDINE 10 MG/ML
20 INJECTION INTRAVENOUS ONCE
Refills: 0 | Status: COMPLETED | OUTPATIENT
Start: 2021-10-08 | End: 2021-10-08

## 2021-10-08 RX ORDER — DIPHENHYDRAMINE HCL 50 MG
1 CAPSULE ORAL
Qty: 20 | Refills: 0
Start: 2021-10-08 | End: 2021-10-17

## 2021-10-08 RX ORDER — FAMOTIDINE 10 MG/ML
1 INJECTION INTRAVENOUS
Qty: 20 | Refills: 0
Start: 2021-10-08 | End: 2021-10-17

## 2021-10-08 RX ORDER — DIPHENHYDRAMINE HCL 50 MG
25 CAPSULE ORAL ONCE
Refills: 0 | Status: COMPLETED | OUTPATIENT
Start: 2021-10-08 | End: 2021-10-08

## 2021-10-08 RX ADMIN — Medication 25 MILLIGRAM(S): at 21:13

## 2021-10-08 RX ADMIN — FAMOTIDINE 20 MILLIGRAM(S): 10 INJECTION INTRAVENOUS at 21:13

## 2021-10-08 NOTE — ED STATDOCS - OBJECTIVE STATEMENT
34 y/o male with no pertinent PMHx presents to the ED 34 y/o male with no pertinent PMHx presents to the ED c/o diffuse itchy rash x3 weeks. Seen in ER and by PMD, given creams and pill with no relief. No other complaints.  used, id# [id number]

## 2021-10-08 NOTE — ED STATDOCS - CARE PROVIDER_API CALL
Mahesh Myers)  Dermatology  [address] [place] [address]  Phone: [phone]  Fax: [phone]  Follow Up Time:

## 2021-10-08 NOTE — ED STATDOCS - PATIENT PORTAL LINK FT
You can access the FollowMyHealth Patient Portal offered by [facility] by registering at the following website: [email or web address]. By joining [employer]’s FollowMyHealth portal, you will also be able to view your health information using other applications (apps) compatible with our system.

## 2021-10-08 NOTE — ED ADULT TRIAGE NOTE - CHIEF COMPLAINT QUOTE
Patient presents complaining of hives and itches for 22 days. patient states he was see here and discharged with medications with no relief. states he followed up with PMD and was given a cream with no relief. patient states it is worst in his groin.

## 2021-10-08 NOTE — ED STATDOCS - ATTENDING CONTRIBUTION TO CARE
I, Lucas Cross, performed the initial face to face bedside interview with this patient regarding history of present illness, review of symptoms and relevant past medical, social and family history.  I completed an independent physical examination.  I was the initial provider who evaluated this patient. I have signed out the follow up of any pending tests (i.e. labs, radiological studies) to the ACP.  I have communicated the patient’s plan of care and disposition with the ACP.  The history, relevant review of systems, past medical and surgical history, medical decision making, and physical examination was documented by the scribe in my presence and I attest to the accuracy of the documentation.       pt with diffuse itchy rash x 3 wks.   visible abraded rash diffuse.   no rash to palms.   works as a .   possible contact derm.   will give meds and advised to f/u with derm

## 2021-10-08 NOTE — ED STATDOCS - NSFOLLOWUPCLINICS_GEN_ALL_ED_FT
[facility]  Family Medicine  [address] [place] [address]  Phone: [phone]  Fax:

## 2021-10-08 NOTE — ED STATDOCS - PROGRESS NOTE DETAILS
signed Shanel Baez PA-C Pt initially seen and examined by Dr. Cross in intake.   Pt here for itchy rash for weeks, rx benadryl pepcid, f/u [facility] or derm.

## 2021-10-08 NOTE — ED STATDOCS - NSFOLLOWUPINSTRUCTIONS_ED_ALL_ED_FT
Erupción cutánea en los adultos    Rash, Adult    Angeles erupción es un cambio en el color de la piel. Angeles erupción también puede cambiar la forma en que se siente la piel. Hay muchas afecciones y factores diferentes que pueden causar angeles erupción. Algunas erupciones pueden desaparecer después de algunos días, filomena otras pueden durar algunas semanas. Entre las causas frecuentes de erupciones se incluyen las siguientes:•Infecciones virales hugo:  •Resfríos.      •Sarampión.      •Enfermedad mano-pie-boca.      •Infecciones bacterianas, hugo:  •Escarlatina.      •Impétigo.        •Infecciones por hongos, hugo Candida.      •Reacciones alérgicas a los alimentos, medicamentos o productos para el cuidado de la piel.        Siga estas indicaciones en garcia casa:    El objetivo del tratamiento es calmar la picazón y evitar que la erupción se propague. Esté atento a cualquier cambio en los síntomas. Estas indicaciones pueden ayudarlo con la afección:      Medicamentos   [place] o aplíquese los medicamentos de venta azeb y los recetados solamente hugo se lo haya indicado el médico. Estos medicamentos pueden incluir:  •Cremas con corticoesteroides para tratar la piel enrojecida o hinchada.      •Lociones para aliviar la picazón.      •Antialérgicos por vía oral (antihistamínicos).      •Corticoesteroides por vía oral para los síntomas graves.      Cuidado de la piel     •Aplique compresas frías en las zonas afectadas.      • No se rasque ni se refriegue la piel.      •Evite cubrir la erupción. Asegúrese de que la erupción esté expuesta al aire todo lo posible.      Control de la picazón y las molestias     •Evite los fang de inmersión y las duchas calientes ya que pueden empeorar la picazón. [place] ducha fría puede aliviar el malestar.    •Trate de brenton un baño con lo siguiente:  •Sales de [place]. Siga las indicaciones del fabricante que se encuentran en el envase. Puede conseguirlas en la edgar de comestibles o la farmacia local.      •Bicarbonato de sodio. Vierta un poco en la bañera hugo se lo haya indicado el médico.      •Matthew coloidal. Siga las indicaciones del fabricante que se encuentran en el envase. Puede conseguirla en la edgar de comestibles o la farmacia local.        •Intente colocarse angeles pasta de bicarbonato de sodio sobre la piel. Agregue agua al bicarbonato hasta que tenga la consistencia de angeles pasta.      •Pruebe aplicarse loción de calamina. Se trata de angeles loción de venta azeb que ayuda a aliviar la picazón.      •Manténgase fresco y al resguardo javier. La transpiración y el calor pueden empeorar la picazón.        Indicaciones generales      •Descanse todo lo que sea necesario.      •Rebeca suficiente líquido hugo para mantener la orina de color amarillo pálido.      •Use ropa holgada.      •Evite los detergentes y los jabones perfumados, y los perfumes. Utilice jabones, detergentes, perfumes y cosméticos suaves.    •Evite las sustancias que causan la erupción. Lleve un diario hugo ayuda para registrar lo que le causa erupción. Escriba los siguientes datos:  •Lo que come.      •Los cosméticos que utiliza.      •Lo que arabella.      •La ropa que usa. Emlyn incluye las alhajas.        •Concurra a todas las visitas de seguimiento hugo se lo haya indicado el médico. [place] es importante.        Comuníquese con un médico si:    •Transpira de noche.      •Pierde peso.      •Orina más de lo normal.      •Orina menos de lo normal u observa que la orina es de color más oscuro que lo habitual.      •Se siente débil.      •Vomita.      •Tiene un color amarillo en la piel o en las partes hernan del yasmeen (ictericia).    •La piel:  •Hormiguea.      •Se adormece.      •La erupción cutánea:  •No desaparece después de varios días.      •Empeora.      •Usted:  •Está inusualmente sediento.      •Está más cansado que lo habitual.      •Tiene los siguientes síntomas:  •Síntomas nuevos.      •Dolor en el abdomen.      •Fiebre.      •Diarrea.          Solicite ayuda inmediatamente si:    •Tiene fiebre y los síntomas empeoran repentinamente.      •Presenta confusión.      •Tiene un dolor de fernandez intenso o rigidez en el miranda.      •Tiene dolor intenso o rigidez en las articulaciones.      •Tiene angeles convulsión.      •Presenta angeles erupción que cubre todo o poncho todo el cuerpo. La erupción puede o no ser dolorosa.    •Le aparecen ampollas que:  •Se encuentran arriba de la erupción.      •Se agrandan o crecen juntas.      •Son dolorosas.      •Están dentro de la nariz o la boca.      •Presenta angeles erupción que:  •Tiene pequeñas manchas moradas, hugo si fueran pinchazos, en todo el cuerpo.      •Tiene un “yasmeen de buey” o se parece a un correa de tiro.      •No está relacionada con angeles exposición al sol, está enrojecida y duele, y produce descamación de la piel.          Resumen    •Angeles erupción es un cambio en el color de la piel. Algunas erupciones desaparecen después de algunos días, filomena otras pueden durar algunas semanas.      •El objetivo del tratamiento es calmar la picazón y evitar que la erupción se propague.      •[place] o aplíquese los medicamentos de venta azeb y los recetados solamente hugo se lo haya indicado el médico.      •Comuníquese con un médico si tiene síntomas nuevos o los síntomas empeoran.      •Concurra a todas las visitas de seguimiento hugo se lo haya indicado el médico. [place] es importante.      Esta información no tiene hugo fin reemplazar el consejo del médico. Asegúrese de hacerle al médico cualquier pregunta que tenga.      Document Revised: 08/22/2019 Document Reviewed: 08/22/2019    Elsevier Patient Education © 2021 [employer]    SIGUE A TU MÉDICO EN 1-2 DÍAS. REGRESE A LA ER PARA CUALQUIER SÍNTOMA PENDIENTE O NUEVAS PREOCUPACIONES.

## 2021-10-19 NOTE — ED ADULT TRIAGE NOTE - WEIGHT IN KG
63.5
Alert-The patient is alert, awake and responds to voice. The patient is oriented to time, place, and person. The triage nurse is able to obtain subjective information.

## 2022-01-21 ENCOUNTER — EMERGENCY (EMERGENCY)
Facility: HOSPITAL | Age: 37
LOS: 0 days | Discharge: HOME | End: 2022-01-21
Attending: EMERGENCY MEDICINE
Payer: MEDICAID

## 2022-01-21 VITALS
HEART RATE: 58 BPM | RESPIRATION RATE: 16 BRPM | OXYGEN SATURATION: 100 % | DIASTOLIC BLOOD PRESSURE: 73 MMHG | SYSTOLIC BLOOD PRESSURE: 107 MMHG | TEMPERATURE: 98 F

## 2022-01-21 VITALS
SYSTOLIC BLOOD PRESSURE: 120 MMHG | WEIGHT: 139.99 LBS | TEMPERATURE: 98 F | OXYGEN SATURATION: 99 % | HEART RATE: 59 BPM | HEIGHT: 65 IN | RESPIRATION RATE: 14 BRPM | DIASTOLIC BLOOD PRESSURE: 77 MMHG

## 2022-01-21 DIAGNOSIS — R07.9 CHEST PAIN, UNSPECIFIED: ICD-10-CM

## 2022-01-21 DIAGNOSIS — R51.9 HEADACHE, UNSPECIFIED: ICD-10-CM

## 2022-01-21 DIAGNOSIS — J06.9 ACUTE UPPER RESPIRATORY INFECTION, UNSPECIFIED: ICD-10-CM

## 2022-01-21 DIAGNOSIS — R05.9 COUGH, UNSPECIFIED: ICD-10-CM

## 2022-01-21 DIAGNOSIS — Z20.822 CONTACT WITH AND (SUSPECTED) EXPOSURE TO COVID-19: ICD-10-CM

## 2022-01-21 LAB
ALBUMIN SERPL ELPH-MCNC: 3.7 G/DL — SIGNIFICANT CHANGE UP (ref 3.3–5)
ALP SERPL-CCNC: 68 U/L — SIGNIFICANT CHANGE UP (ref 40–120)
ALT FLD-CCNC: 48 U/L — SIGNIFICANT CHANGE UP (ref 12–78)
ANION GAP SERPL CALC-SCNC: 5 MMOL/L — SIGNIFICANT CHANGE UP (ref 5–17)
AST SERPL-CCNC: 13 U/L — LOW (ref 15–37)
BASOPHILS # BLD AUTO: 0.01 K/UL — SIGNIFICANT CHANGE UP (ref 0–0.2)
BASOPHILS NFR BLD AUTO: 0.1 % — SIGNIFICANT CHANGE UP (ref 0–2)
BILIRUB SERPL-MCNC: 0.8 MG/DL — SIGNIFICANT CHANGE UP (ref 0.2–1.2)
BUN SERPL-MCNC: 9 MG/DL — SIGNIFICANT CHANGE UP (ref 7–23)
CALCIUM SERPL-MCNC: 9.3 MG/DL — SIGNIFICANT CHANGE UP (ref 8.5–10.1)
CHLORIDE SERPL-SCNC: 105 MMOL/L — SIGNIFICANT CHANGE UP (ref 96–108)
CO2 SERPL-SCNC: 28 MMOL/L — SIGNIFICANT CHANGE UP (ref 22–31)
CREAT SERPL-MCNC: 0.83 MG/DL — SIGNIFICANT CHANGE UP (ref 0.5–1.3)
EOSINOPHIL # BLD AUTO: 0.01 K/UL — SIGNIFICANT CHANGE UP (ref 0–0.5)
EOSINOPHIL NFR BLD AUTO: 0.1 % — SIGNIFICANT CHANGE UP (ref 0–6)
FLUAV AG NPH QL: SIGNIFICANT CHANGE UP
FLUBV AG NPH QL: SIGNIFICANT CHANGE UP
GLUCOSE SERPL-MCNC: 113 MG/DL — HIGH (ref 70–99)
HCT VFR BLD CALC: 46.1 % — SIGNIFICANT CHANGE UP (ref 39–50)
HGB BLD-MCNC: 15.5 G/DL — SIGNIFICANT CHANGE UP (ref 13–17)
IMM GRANULOCYTES NFR BLD AUTO: 0.3 % — SIGNIFICANT CHANGE UP (ref 0–1.5)
LYMPHOCYTES # BLD AUTO: 0.7 K/UL — LOW (ref 1–3.3)
LYMPHOCYTES # BLD AUTO: 10.3 % — LOW (ref 13–44)
MCHC RBC-ENTMCNC: 30.1 PG — SIGNIFICANT CHANGE UP (ref 27–34)
MCHC RBC-ENTMCNC: 33.6 GM/DL — SIGNIFICANT CHANGE UP (ref 32–36)
MCV RBC AUTO: 89.5 FL — SIGNIFICANT CHANGE UP (ref 80–100)
MONOCYTES # BLD AUTO: 0.34 K/UL — SIGNIFICANT CHANGE UP (ref 0–0.9)
MONOCYTES NFR BLD AUTO: 5 % — SIGNIFICANT CHANGE UP (ref 2–14)
NEUTROPHILS # BLD AUTO: 5.72 K/UL — SIGNIFICANT CHANGE UP (ref 1.8–7.4)
NEUTROPHILS NFR BLD AUTO: 84.2 % — HIGH (ref 43–77)
PLATELET # BLD AUTO: 307 K/UL — SIGNIFICANT CHANGE UP (ref 150–400)
POTASSIUM SERPL-MCNC: 3.8 MMOL/L — SIGNIFICANT CHANGE UP (ref 3.5–5.3)
POTASSIUM SERPL-SCNC: 3.8 MMOL/L — SIGNIFICANT CHANGE UP (ref 3.5–5.3)
PROT SERPL-MCNC: 7.8 GM/DL — SIGNIFICANT CHANGE UP (ref 6–8.3)
RBC # BLD: 5.15 M/UL — SIGNIFICANT CHANGE UP (ref 4.2–5.8)
RBC # FLD: 11.8 % — SIGNIFICANT CHANGE UP (ref 10.3–14.5)
RSV RNA NPH QL NAA+NON-PROBE: SIGNIFICANT CHANGE UP
SARS-COV-2 RNA SPEC QL NAA+PROBE: SIGNIFICANT CHANGE UP
SODIUM SERPL-SCNC: 138 MMOL/L — SIGNIFICANT CHANGE UP (ref 135–145)
TROPONIN I, HIGH SENSITIVITY RESULT: 4.46 NG/L — SIGNIFICANT CHANGE UP
TROPONIN I, HIGH SENSITIVITY RESULT: <3 NG/L — SIGNIFICANT CHANGE UP
WBC # BLD: 6.8 K/UL — SIGNIFICANT CHANGE UP (ref 3.8–10.5)
WBC # FLD AUTO: 6.8 K/UL — SIGNIFICANT CHANGE UP (ref 3.8–10.5)

## 2022-01-21 PROCEDURE — 85025 COMPLETE CBC W/AUTO DIFF WBC: CPT

## 2022-01-21 PROCEDURE — 99284 EMERGENCY DEPT VISIT MOD MDM: CPT | Mod: 25

## 2022-01-21 PROCEDURE — 84484 ASSAY OF TROPONIN QUANT: CPT

## 2022-01-21 PROCEDURE — 96375 TX/PRO/DX INJ NEW DRUG ADDON: CPT

## 2022-01-21 PROCEDURE — 71045 X-RAY EXAM CHEST 1 VIEW: CPT | Mod: 26

## 2022-01-21 PROCEDURE — 99285 EMERGENCY DEPT VISIT HI MDM: CPT

## 2022-01-21 PROCEDURE — 71045 X-RAY EXAM CHEST 1 VIEW: CPT

## 2022-01-21 PROCEDURE — 80053 COMPREHEN METABOLIC PANEL: CPT

## 2022-01-21 PROCEDURE — 0241U: CPT

## 2022-01-21 PROCEDURE — 36415 COLL VENOUS BLD VENIPUNCTURE: CPT

## 2022-01-21 PROCEDURE — 96374 THER/PROPH/DIAG INJ IV PUSH: CPT

## 2022-01-21 RX ORDER — KETOROLAC TROMETHAMINE 30 MG/ML
15 SYRINGE (ML) INJECTION ONCE
Refills: 0 | Status: DISCONTINUED | OUTPATIENT
Start: 2022-01-21 | End: 2022-01-21

## 2022-01-21 RX ORDER — ONDANSETRON 8 MG/1
4 TABLET, FILM COATED ORAL ONCE
Refills: 0 | Status: COMPLETED | OUTPATIENT
Start: 2022-01-21 | End: 2022-01-21

## 2022-01-21 RX ORDER — SODIUM CHLORIDE 9 MG/ML
1000 INJECTION INTRAMUSCULAR; INTRAVENOUS; SUBCUTANEOUS ONCE
Refills: 0 | Status: COMPLETED | OUTPATIENT
Start: 2022-01-21 | End: 2022-01-21

## 2022-01-21 RX ADMIN — SODIUM CHLORIDE 1000 MILLILITER(S): 9 INJECTION INTRAMUSCULAR; INTRAVENOUS; SUBCUTANEOUS at 10:50

## 2022-01-21 RX ADMIN — Medication 15 MILLIGRAM(S): at 11:24

## 2022-01-21 RX ADMIN — SODIUM CHLORIDE 2000 MILLILITER(S): 9 INJECTION INTRAMUSCULAR; INTRAVENOUS; SUBCUTANEOUS at 10:20

## 2022-01-21 RX ADMIN — ONDANSETRON 4 MILLIGRAM(S): 8 TABLET, FILM COATED ORAL at 10:21

## 2022-01-21 NOTE — ED ADULT NURSE NOTE - HISTORY OF COVID-19 VACCINATION
odynophagia improving.  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    - follow up pathology  - continue PPI BID  - start Carafate solution 4x/day  - start valacyclovir 1g BID  - will  advance diet as tolerated Vaccine status unknown

## 2022-01-21 NOTE — ED PROVIDER NOTE - OBJECTIVE STATEMENT
37 y/o male with no significant PMHx presents to the ED c/o CP, cough, HA. Pt got physical therapy on Sunday where he received an injection in the back and O2. Pt says after the session he has had trouble holding down food, vomiting and feeling dizzy. Pt is not vaccinated for COVD no other symptoms at this time. 35 y/o male with no significant PMHx presents to the ED c/o CP, cough, HA. Pt got physical therapy on Sunday where he received an injection in the back for back pain and O2. Pt stated that since then he has had chest pain, fatigue, nausea, dizzy.  Pt is not vaccinated for COVD no other symptoms at this time.

## 2022-01-21 NOTE — ED ADULT NURSE REASSESSMENT NOTE - NS ED NURSE REASSESS COMMENT FT1
report received from previous rn. color good, skin warm and dry, respirations even and unlabored. patient able to speak in full sentences. patient on cardiac monitor. patient awaiting blood work to be drawn and medications to be administered. call bell within reach. patient safety maintained.

## 2022-01-21 NOTE — ED PROVIDER NOTE - PATIENT PORTAL LINK FT
You can access the FollowMyHealth Patient Portal offered by [facility] by registering at the following website: [email or web address]. By joining [employer]’s FollowMyHealth portal, you will also be able to view your health information using other applications (apps) compatible with our system.

## 2022-01-21 NOTE — ED PROVIDER NOTE - CLINICAL SUMMARY MEDICAL DECISION MAKING FREE TEXT BOX
Pt c/o cough, CP,JACKSON. Will get COVID swab. Pt c/o cough, CP,HA. EKG is NSR with  no acute ST/T wave changes, it is a normal EKG, labs and troponin x2 is negative, I do not appreciate any acute process on cxr. Pt c/o cough, CP,HA. EKG is NSR with  no acute ST/T wave changes, it is a normal EKG, labs and troponin x2 is negative, I do not appreciate any acute process on cxr. COVID test is negative here, discussed the results with him, discussed f/u, return precautions and dc in stable condition.

## 2022-01-21 NOTE — ED PROVIDER NOTE - NS ED ROS FT
Constitutional: No fever or chills  Eyes: No visual changes  HEENT: No throat pain, +HA, +dizziness   CV: + chest pain  Resp: No SOB,+ cough  GI: No abd pain, nausea or vomiting  : No dysuria  MSK: No musculoskeletal pain  Skin: No rash  Neuro: No headache

## 2022-01-21 NOTE — ED PROVIDER NOTE - NSICDXPASTMEDICALHX_GEN_ALL_CORE_FT
Electrodesiccation Text: The wound bed was treated with electrodesiccation after the biopsy was performed. Size Of Lesion In Cm: 0 Notification Instructions: Patient will be notified of biopsy results. However, patient instructed to call the office if not contacted within 2 weeks. Destruction After The Procedure: No Type Of Destruction Used: Curettage Electrodesiccation And Curettage Text: The wound bed was treated with electrodesiccation and curettage after the biopsy was performed. Dressing: bandage Biopsy Type: H and E Curettage Text: The wound bed was treated with curettage after the biopsy was performed. Post-Care Instructions: I reviewed with the patient in detail post-care instructions. Patient is to keep the biopsy site dry overnight, and then apply bacitracin twice daily until healed. Patient may apply hydrogen peroxide soaks to remove any crusting. Cryotherapy Text: The wound bed was treated with cryotherapy after the biopsy was performed. Silver Nitrate Text: The wound bed was treated with silver nitrate after the biopsy was performed. Consent: Written consent was obtained and risks were reviewed including but not limited to scarring, infection, bleeding, scabbing, incomplete removal, nerve damage and allergy to anesthesia. Anesthesia Type: 1% lidocaine with epinephrine and a 1:10 solution of 8.4% sodium bicarbonate Hemostasis: Drysol Biopsy Method: Dermablade Anesthesia Volume In Cc: 0.5 Detail Level: Detailed Billing Type: Third-Party Bill Wound Care: [place] PAST MEDICAL HISTORY:  No pertinent past medical history        PAST MEDICAL HISTORY:  No pertinent past medical history

## 2022-01-21 NOTE — ED PROVIDER NOTE - PHYSICAL EXAMINATION
Constitutional: NAD AAOx3  Eyes: PERRLA EOMI  Head: Normocephalic atraumatic  Mouth: MMM  Cardiac: regular rate   Resp: Lungs CTAB  GI: Abd s/nt/nd  Neuro: CN2-12 intact  Extremities: Intact distal pulses b/l, no calf tenderness, normal ROM b/l UE and LE   Skin: No rashes

## 2022-01-21 NOTE — ED ADULT TRIAGE NOTE - CHIEF COMPLAINT QUOTE
pt presents to ED due to complaints of chest pain with nausea and vomiting pt was sitting in his car when EMS arrived

## 2022-08-21 NOTE — ED STATDOCS - ATTENDING CONTRIBUTION TO CARE
I, Tomasz Clayton MD, personally saw the patient with the PA, and completed the key components of the history and physical exam. I then discussed the management plan with the PA. General: Feels cold , denies dizziness, fatigue  Respiratory: Denies cough, SOB  Cardiovascular: Denies palpitations, CP  Gastrointestinal: Denies abd pain, N/V/D/C, hematochezia, melena  : Denies dysuria, increased freq  Musculoskeletal: Pain in left arm, Denies edema, joint pain  Neuro: Denies weakness, numbness  Psych: + Depressive, denies SI/HI  All ROS negative unless indicated above

## 2023-02-08 NOTE — ED ADULT NURSE NOTE - NS PRO AD NO ADVANCE DIRECTIVE
Hospital Medicine History & Physical      PCP: Tita Wren MD    Date of Admission: 2/7/2023    Date of Service: Pt seen/examined on 2/7/2023 and Admitted to Inpatient with expected LOS greater than two midnights due to medical therapy. Chief Complaint: Left hip pain      History Of Present Illness:   75 y.o. female who presented to [facility] with above complaints  Patient with PMH of COPD on home O2 3 LPM, ILD, tobacco abuse, depression presented to the ED via EMS with complaints of left hip pain. Patient reports that she slipped on the hardwood floor landing on her left hip last night, she was helped to the couch by her , has not been able to ambulate or stand since then. Has severe left hip pain 8/10 intensity worse with movement of her left lower extremity. Denies any head trauma or loss of consciousness. Denies any neck pain. She continues to smoke about a pack a day.      Past Medical History:          Diagnosis Date    Allergic rhinitis 6/11/2010    Anxiety     Arthritis     Aspiration pneumonia ([address] 3/21/2012    Recurrent    Asthma     Bronchitis chronic     COPD (chronic obstructive pulmonary disease) ([address] 12/3/2009    Depression     Drug abuse, cocaine type ([address]     past history of crack cocaine use    Emphysema of lung ([address]     Fibromyalgia     GERD (gastroesophageal reflux disease)     Hyperlipidemia     Influenza A 03/05/2020    Lung disease     On home oxygen therapy     uses O2 NC 3L prn at night    Osteoporosis     Pneumonia     Polysubstance dependence ([address] 1/2/2012    Psychoactive substance-induced organic hallucinosis ([address] 1/2/2012    Pulmonary fibrosis ([address] 10/16/2014    Pulmonary infiltrate     Pulmonary nodule 12/3/2009    Tobacco abuse        Past Surgical History:          Procedure Laterality Date    BLADDER REPAIR      BRONCHOSCOPY      BRONCHOSCOPY N/A 3/6/2020    BRONCHOSCOPY THERAPUTIC ASPIRATION INITIAL performed by Shagufta Rosales MD at [address] [place] [address] ENDOSCOPY    BRONCHOSCOPY  3/6/2020    BRONCHOSCOPY ALVEOLAR LAVAGE performed by Delos Leventhal, MD at [address] [place] [address] N/A 6/26/2020    BRONCHOSCOPY THERAPUTIC ASPIRATION INITIAL performed by Rajwinder Prado MD at [address] [place] [address]  6/26/2020    BRONCHOSCOPY ALVEOLAR LAVAGE performed by Rajwinder Prado MD at [address] [place] [address]  06/23/2021    Dr Yina Alvarado N/A 6/23/2021    BRONCHOSCOPY DIAGNOSTIC OR CELL [address] performed by Rajwinder Prado MD at [address] [place] [address]  6/23/2021    BRONCHOSCOPY THERAPUTIC ASPIRATION INITIAL performed by Rajwinder Prado MD at [address] [place] [address]  6/23/2021    BRONCHOSCOPY ALVEOLAR LAVAGE performed by Rajwinder Prado MD at [address] [place] [address] 8/27/2021    BRONCHOSCOPY DIAGNOSTIC OR CELL [address] ONLY performed by Rajwinder Prado MD at [address] [facility]  2012    ENDOSCOPY, COLON, DIAGNOSTIC      ESOPHAGEAL DILATION  9/20/2018    ESOPHAGEAL DILATION Quang Jolly performed by Albaro Torres MD at [place] [address]  2/12/15    T8 Kyphoplasty    [place] COLONOSCOPY FLX DX W/COLLJ SPEC WHEN PFRMD N/A 9/20/2018    EGD AND COLONOSCOPY WITH ANESTHESIA performed by Albaro Torres MD at [address] [place] [address] ESOPHAGOGASTRODUODENOSCOPY TRANSORAL DIAGNOSTIC N/A 9/20/2018    EGD AND COLONOSCOPY WITH ANESTHESIA performed by Albaro Torres MD at [address] [place] [address]         Medications Prior to Admission:      Prior to Admission medications    Medication Sig Start Date End Date Taking?  Authorizing Provider   albuterol sulfate HFA (VENTOLIN HFA) 108 (90 Base) MCG/ACT inhaler Inhale 2 puffs into the lungs every 6 hours as needed for Wheezing or Shortness of Breath 8/25/22   Annabella Roa MD   ipratropium-albuterol (DUONEB) 0.5-2.5 (3) MG/3ML SOLN nebulizer solution Inhale 3 mLs into the lungs every 4 hours as needed for Shortness of Breath 9/23/21   Linda Knott MD   traZODone (DESYREL) 150 MG tablet Take 1-2 tablets by mouth nightly TAKE 2 TABLETS AT BEDTIME 9/13/21   Vic Carreon MD   guaiFENesin (MUCINEX) 600 MG extended release tablet Take 1 tablet by mouth 2 times daily as needed for Congestion 8/29/21   Randa Maravilla MD   fluticasone-salmeterol [facility]) 500-50 MCG/DOSE diskus inhaler Inhale 1 puff into the lungs every 12 hours 7/21/21   Linda Knott MD   albuterol (PROVENTIL) (2.5 MG/3ML) 0.083% nebulizer solution Take 3 mLs by nebulization every 6 hours as needed for Wheezing or Shortness of Breath DX COPD J44.9 7/21/21   Linda Knott MD   montelukast (SINGULAIR) 10 MG tablet TAKE 1 TABLET BY MOUTH EACH NIGHT 7/21/21   Linda Knott MD   BD PEN NEEDLE SVETLANA U/F 32G X 4 MM MISC USE ONE DAILY AS DIRECTED 12/11/20   Vic Carreon MD   FLUoxetine (PROZAC) 20 MG capsule TAKE 3 CAPSULES BY MOUTH DAILY 12/3/20   Vic Carreon MD   simvastatin (ZOCOR) 20 MG tablet TAKE 1 TABLET EVERY EVENING 8/7/20   Vic Carreon MD   busPIRone (BUSPAR) 30 MG tablet TAKE 1 TABLET TWICE DAILY 8/7/20   Vic Carreon MD   teriparatide, recombinant, (FORTEO) 600 MCG/2.4ML SOLN injection Inject 0.08 mLs into the skin daily One dose injected daily. 9/27/19 12/7/20  Tiffanie Davila MD       Allergies:  Meperidine and Demerol    Social History:      The patient currently lives at home    TOBACCO:   reports that she has been smoking cigarettes. She started smoking about 51 years ago. She has a 40.00 pack-year smoking history. She has never used smokeless tobacco.  ETOH:   reports no history of alcohol use. E-cigarette/Vaping       Questions Responses    E-cigarette/Vaping Use Never User    Start Date     Passive Exposure     Quit Date     Counseling Given     Comments Unknown              Family History:      Reviewed and negative in regards to presenting illness/complaint.         Problem Relation Age of Onset    Asthma Mother     Diabetes Mother     Emphysema Mother     Heart Failure Mother     Hypertension Mother     Heart Disease Mother     High Cholesterol Mother     Cancer Mother     Depression Mother     Diabetes Father     Emphysema Father     Heart Failure Father     Hypertension Father     Heart Disease Father     High Cholesterol Father     Substance Abuse Father     Emphysema Paternal Grandfather     Diabetes Sister     High Cholesterol Sister     Vision Loss Maternal Uncle        REVIEW OF SYSTEMS COMPLETED:   Pertinent positives as noted in the HPI. All other systems reviewed and negative. PHYSICAL EXAM PERFORMED:    /69   Pulse 95   Temp 98.1 °F (36.7 °C) (Oral)   Resp 16   Ht 5' 5\" (1.651 m)   Wt 150 lb (68 kg)   SpO2 95%   BMI 24.96 kg/m²     General appearance:  No apparent distress, appears stated age and cooperative. HEENT:  Normal cephalic, atraumatic without obvious deformity. Pupils equal, round, and reactive to light. Extra ocular muscles intact. Conjunctivae/corneas clear. Neck: Supple, with full range of motion. No jugular venous distention. Trachea midline. Respiratory:  Normal respiratory effort. Diminished breath sounds bilaterally to auscultation with occasional rhonchi   cardiovascular:  Regular rate and rhythm with normal S1/S2 without murmurs, rubs or gallops. Abdomen: Soft, non-tender, non-distended with normal bowel sounds. Musculoskeletal:  No clubbing, cyanosis or edema bilaterally. Left hip limited ROM due to pain  Skin: Skin color, texture, turgor normal.  No rashes or lesions. Neurologic:  Neurovascularly intact without any focal sensory/motor deficits.  Cranial nerves: II-XII intact, grossly non-focal.  Psychiatric:  Alert and oriented, thought content appropriate, normal insight  Capillary Refill: Brisk,3 seconds, normal  Peripheral Pulses: +2 palpable, equal bilaterally       Labs:     Recent Labs     02/07/23  1708   WBC 11.1*   HGB 12.4   HCT 38.0    Recent Labs     02/07/23  1708   *   K 4.6   CL 89*   CO2 30   BUN 6*   CREATININE <0.5*   CALCIUM 9.2     Recent Labs     02/07/23  1708   AST 24   ALT 12   BILITOT 0.3   ALKPHOS 77     No results for input(s): INR in the last 72 hours.   Recent Labs     02/07/23  1708   TROPONINI <0.01       Urinalysis:      Lab Results   Component Value Date/Time    NITRU POSITIVE 05/27/2022 11:05 PM    WBCUA 21-50 05/27/2022 11:05 PM    BACTERIA 3+ 05/27/2022 11:05 PM    RBCUA 0-2 05/27/2022 11:05 PM    BLOODU Negative 05/27/2022 11:05 PM    SPECGRAV >=1.030 05/27/2022 11:05 PM    GLUCOSEU Negative 05/27/2022 11:05 PM    GLUCOSEU NEGATIVE 04/13/2012 12:13 PM       Radiology:   I reviewed the x-ray of the left hip personally including images, seen is impacted subcapital fracture of left femur    CXR: I have reviewed the CXR with the following interpretation: Patchy opacity in the left lower lobe  EKG:  I have reviewed the EKG with the following interpretation: NSR, rate 80, no acute ischemic changes    XR CHEST PORTABLE   Final Result      Patchy opacity in the left lower lobe and mildly in the right upper lobe         XR HIP 2-3 VW W PELVIS LEFT   Final Result   Impacted subcapital fracture on the left             Consults:    IP CONSULT TO HOSPITALIST    ASSESSMENT:PLAN:    Active Hospital Problems    Diagnosis Date Noted    Tobacco dependence [F17.200] 09/27/2014     Priority: High    Subcapital fracture of femur, left, closed, initial encounter ([facility] Yovany Vasquez 02/07/2023     Priority: Medium    Chronic respiratory failure with hypoxia ([facility] [J96.11] 02/07/2023     Priority: Medium    Stage 3 severe COPD by GOLD classification ([facility] [J44.9] 12/03/2009     Priority: Medium    Anxiety and depression [F41.9, F32.A] 12/14/2018    ILD (interstitial lung disease) ([facility] [J84.9] 04/06/2016     Subcapital fracture of femur, left, closed, initial encounter  -Orthopedics consulted  -N.p.o. after midnight  -Pain control No ordered  -SCD for DVT prophylaxis ordered  -Patient is at moderate risk for any perioperative cardiac complications for the intermediate risk surgery. No further testing indicated at this time. Can proceed with surgery with the aforementioned risk. Patient has history of severe COPD and may require Pap therapy after extubation postsurgery. Will need aggressive pulmonary toilet, incentive spirometry in the postoperative period  To prevent atelectasis, collapse and pneumonia. Will probably require bronchodilator therapy. Stage III severe COPD  Interstitial lung disease  Chronic respiratory failure with hypoxemia   -continue home oxygen therapy 3 LPM.  No symptoms of pneumonia.    -Continue Dulera inhaler twice daily, albuterol nebs as needed   -We will add Spiriva daily    Hyponatremia -IV normal saline infusion initiated, check BMP in a.m. .  Likely due to hypovolemia    Tobacco abuse-counseled cessation    Depression-mood stable, resume Prozac, BuSpar daily and trazodone at night    HLD-resume statin    DVT Prophylaxis: SCD  Diet: Diet NPO  Code Status: Prior    PT/OT Eval Status: Per orthopedics postsurgery    Dispo -IP stay       Meli Greer MD    Thank you Oumar Spence MD for the opportunity to be involved in this patient's care. If you have any questions or concerns please feel free to contact me at [phone].

## 2023-10-06 NOTE — ED ADULT NURSE NOTE - NS ED NURSE LEVEL OF CONSCIOUSNESS MENTAL STATUS
Pt returns from walking in halls with PT.  Pt will attempt to void but states he doesn't have the urge.  Still no wife at bedside.   Awake/Alert

## 2024-07-16 ENCOUNTER — EMERGENCY (EMERGENCY)
Facility: HOSPITAL | Age: 39
LOS: 1 days | Discharge: HOME | End: 2024-07-16
Attending: EMERGENCY MEDICINE
Payer: SELF-PAY

## 2024-07-16 VITALS
WEIGHT: 154.98 LBS | DIASTOLIC BLOOD PRESSURE: 76 MMHG | TEMPERATURE: 98 F | RESPIRATION RATE: 17 BRPM | SYSTOLIC BLOOD PRESSURE: 116 MMHG | OXYGEN SATURATION: 97 % | HEART RATE: 89 BPM

## 2024-07-16 LAB
ALBUMIN SERPL ELPH-MCNC: 4 G/DL — SIGNIFICANT CHANGE UP (ref 3.3–5.2)
ALP SERPL-CCNC: 81 U/L — SIGNIFICANT CHANGE UP (ref 40–120)
ALT FLD-CCNC: 32 U/L — SIGNIFICANT CHANGE UP
ANION GAP SERPL CALC-SCNC: 14 MMOL/L — SIGNIFICANT CHANGE UP (ref 5–17)
APPEARANCE UR: CLEAR — SIGNIFICANT CHANGE UP
APTT BLD: 29.8 SEC — SIGNIFICANT CHANGE UP (ref 24.5–35.6)
AST SERPL-CCNC: 33 U/L — SIGNIFICANT CHANGE UP
BASOPHILS # BLD AUTO: 0.01 K/UL — SIGNIFICANT CHANGE UP (ref 0–0.2)
BASOPHILS NFR BLD AUTO: 0.2 % — SIGNIFICANT CHANGE UP (ref 0–2)
BILIRUB SERPL-MCNC: 0.5 MG/DL — SIGNIFICANT CHANGE UP (ref 0.4–2)
BILIRUB UR-MCNC: NEGATIVE — SIGNIFICANT CHANGE UP
BUN SERPL-MCNC: 10.4 MG/DL — SIGNIFICANT CHANGE UP (ref 8–20)
CALCIUM SERPL-MCNC: 8.3 MG/DL — LOW (ref 8.4–10.5)
CHLORIDE SERPL-SCNC: 100 MMOL/L — SIGNIFICANT CHANGE UP (ref 96–108)
CO2 SERPL-SCNC: 24 MMOL/L — SIGNIFICANT CHANGE UP (ref 22–29)
COLOR SPEC: YELLOW — SIGNIFICANT CHANGE UP
CREAT SERPL-MCNC: 0.64 MG/DL — SIGNIFICANT CHANGE UP (ref 0.5–1.3)
DIFF PNL FLD: NEGATIVE — SIGNIFICANT CHANGE UP
EGFR: 123 ML/MIN/1.73M2 — SIGNIFICANT CHANGE UP
EOSINOPHIL # BLD AUTO: 0.03 K/UL — SIGNIFICANT CHANGE UP (ref 0–0.5)
EOSINOPHIL NFR BLD AUTO: 0.7 % — SIGNIFICANT CHANGE UP (ref 0–6)
ETHANOL SERPL-MCNC: 160 MG/DL — HIGH (ref 0–9)
GLUCOSE SERPL-MCNC: 121 MG/DL — HIGH (ref 70–99)
GLUCOSE UR QL: NEGATIVE MG/DL — SIGNIFICANT CHANGE UP
HCT VFR BLD CALC: 40.9 % — SIGNIFICANT CHANGE UP (ref 39–50)
HGB BLD-MCNC: 14.2 G/DL — SIGNIFICANT CHANGE UP (ref 13–17)
IMM GRANULOCYTES NFR BLD AUTO: 0.2 % — SIGNIFICANT CHANGE UP (ref 0–0.9)
INR BLD: 1.02 RATIO — SIGNIFICANT CHANGE UP (ref 0.85–1.18)
KETONES UR-MCNC: NEGATIVE MG/DL — SIGNIFICANT CHANGE UP
LACTATE SERPL-SCNC: 1.6 MMOL/L — SIGNIFICANT CHANGE UP (ref 0.5–2)
LEUKOCYTE ESTERASE UR-ACNC: NEGATIVE — SIGNIFICANT CHANGE UP
LIDOCAIN IGE QN: 32 U/L — SIGNIFICANT CHANGE UP (ref 22–51)
LYMPHOCYTES # BLD AUTO: 1.08 K/UL — SIGNIFICANT CHANGE UP (ref 1–3.3)
LYMPHOCYTES # BLD AUTO: 23.5 % — SIGNIFICANT CHANGE UP (ref 13–44)
MCHC RBC-ENTMCNC: 30.4 PG — SIGNIFICANT CHANGE UP (ref 27–34)
MCHC RBC-ENTMCNC: 34.7 GM/DL — SIGNIFICANT CHANGE UP (ref 32–36)
MCV RBC AUTO: 87.6 FL — SIGNIFICANT CHANGE UP (ref 80–100)
MONOCYTES # BLD AUTO: 0.31 K/UL — SIGNIFICANT CHANGE UP (ref 0–0.9)
MONOCYTES NFR BLD AUTO: 6.8 % — SIGNIFICANT CHANGE UP (ref 2–14)
NEUTROPHILS # BLD AUTO: 3.15 K/UL — SIGNIFICANT CHANGE UP (ref 1.8–7.4)
NEUTROPHILS NFR BLD AUTO: 68.6 % — SIGNIFICANT CHANGE UP (ref 43–77)
NITRITE UR-MCNC: NEGATIVE — SIGNIFICANT CHANGE UP
PH UR: 6.5 — SIGNIFICANT CHANGE UP (ref 5–8)
PLATELET # BLD AUTO: 237 K/UL — SIGNIFICANT CHANGE UP (ref 150–400)
POTASSIUM SERPL-MCNC: 3.3 MMOL/L — LOW (ref 3.5–5.3)
POTASSIUM SERPL-SCNC: 3.3 MMOL/L — LOW (ref 3.5–5.3)
PROT SERPL-MCNC: 6.8 G/DL — SIGNIFICANT CHANGE UP (ref 6.6–8.7)
PROT UR-MCNC: NEGATIVE MG/DL — SIGNIFICANT CHANGE UP
PROTHROM AB SERPL-ACNC: 11.3 SEC — SIGNIFICANT CHANGE UP (ref 9.5–13)
RBC # BLD: 4.67 M/UL — SIGNIFICANT CHANGE UP (ref 4.2–5.8)
RBC # FLD: 12.4 % — SIGNIFICANT CHANGE UP (ref 10.3–14.5)
SODIUM SERPL-SCNC: 138 MMOL/L — SIGNIFICANT CHANGE UP (ref 135–145)
SP GR SPEC: 1.02 — SIGNIFICANT CHANGE UP (ref 1–1.03)
UROBILINOGEN FLD QL: 0.2 MG/DL — SIGNIFICANT CHANGE UP (ref 0.2–1)
WBC # BLD: 4.59 K/UL — SIGNIFICANT CHANGE UP (ref 3.8–10.5)
WBC # FLD AUTO: 4.59 K/UL — SIGNIFICANT CHANGE UP (ref 3.8–10.5)

## 2024-07-16 PROCEDURE — 74177 CT ABD & PELVIS W/CONTRAST: CPT | Mod: 26,MC

## 2024-07-16 PROCEDURE — 72170 X-RAY EXAM OF PELVIS: CPT | Mod: 26

## 2024-07-16 PROCEDURE — 70486 CT MAXILLOFACIAL W/O DYE: CPT | Mod: 26,MC

## 2024-07-16 PROCEDURE — 71045 X-RAY EXAM CHEST 1 VIEW: CPT | Mod: 26

## 2024-07-16 PROCEDURE — 99285 EMERGENCY DEPT VISIT HI MDM: CPT

## 2024-07-16 PROCEDURE — 72125 CT NECK SPINE W/O DYE: CPT | Mod: 26,MC

## 2024-07-16 PROCEDURE — 71260 CT THORAX DX C+: CPT | Mod: 26,MC

## 2024-07-16 PROCEDURE — 70450 CT HEAD/BRAIN W/O DYE: CPT | Mod: 26,MC

## 2024-07-16 RX ORDER — SODIUM CHLORIDE 0.9 % (FLUSH) 0.9 %
250 SYRINGE (ML) INJECTION ONCE
Refills: 0 | Status: DISCONTINUED | OUTPATIENT
Start: 2024-07-16 | End: 2024-07-24

## 2024-07-16 RX ORDER — TETANUS TOXOID, REDUCED DIPHTHERIA TOXOID AND ACELLULAR PERTUSSIS VACCINE, ADSORBED 5; 2.5; 8; 8; 2.5 [IU]/.5ML; [IU]/.5ML; UG/.5ML; UG/.5ML; UG/.5ML
0.5 SUSPENSION INTRAMUSCULAR ONCE
Refills: 0 | Status: COMPLETED | OUTPATIENT
Start: 2024-07-16 | End: 2024-07-16

## 2024-07-16 RX ADMIN — TETANUS TOXOID, REDUCED DIPHTHERIA TOXOID AND ACELLULAR PERTUSSIS VACCINE, ADSORBED 0.5 MILLILITER(S): 5; 2.5; 8; 8; 2.5 SUSPENSION INTRAMUSCULAR at 23:24
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
## 2024-07-16 NOTE — ED PROVIDER NOTE - PATIENT PORTAL LINK FT
You can access the FollowMyHealth Patient Portal offered by [facility] by registering at the following website: [email or web address]. By joining [employer]’s FollowMyHealth portal, you will also be able to view your health information using other applications (apps) compatible with our system.
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
## 2024-07-16 NOTE — ED ADULT NURSE NOTE - NSFALLUNIVINTERV_ED_ALL_ED
Bed/Stretcher in lowest position, wheels locked, appropriate side rails in place/Call bell, personal items and telephone in reach/Instruct patient to call for assistance before getting out of bed/chair/stretcher/Non-slip footwear applied when patient is off stretcher/[place] to call system/Physically safe environment - no spills, clutter or unnecessary equipment/Purposeful proactive rounding/Room/bathroom lighting operational, light cord in reach
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
## 2024-07-16 NOTE — ED PROVIDER NOTE - PHYSICAL EXAMINATION
General: Awake, alert, lying in bed in NAD bandages throughout his body, removed.  HEENT: Normocephalic, atraumatic. No scleral icterus or conjunctival injection. EOMI. Moist mucous membranes. Oropharynx clear.   Neck:. Soft and supple.  Cardiac: RRR, Peripheral pulses 2+ and symmetric. No LE edema.  Resp: Lungs CTAB. No accessory muscle use  Abd: Soft, non-tender, non-distended. No guarding, rebound, or rigidity.  Back: Spine midline and non-tender.   Skin: superficial abrasions throughout body. No rashes or lacerations.  Neuro: AO x 4. Moves all extremities symmetrically. Motor strength and sensation grossly intact. FROM of all joints.  Psych: Appropriate mood and affect
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
## 2024-07-16 NOTE — ED PROVIDER NOTE - PROGRESS NOTE DETAILS
Facial abrasions clean and patient with noted deep linear abrasion through nose which was  irrigated and closed with Dermabond with good closure hemostasis, other abrasions to not require any other
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
## 2024-07-17 LAB — BLD GP AB SCN SERPL QL: SIGNIFICANT CHANGE UP

## 2024-07-17 PROCEDURE — 86900 BLOOD TYPING SEROLOGIC ABO: CPT

## 2024-07-17 PROCEDURE — 70450 CT HEAD/BRAIN W/O DYE: CPT | Mod: MC

## 2024-07-17 PROCEDURE — 71045 X-RAY EXAM CHEST 1 VIEW: CPT

## 2024-07-17 PROCEDURE — 85730 THROMBOPLASTIN TIME PARTIAL: CPT

## 2024-07-17 PROCEDURE — 72170 X-RAY EXAM OF PELVIS: CPT

## 2024-07-17 PROCEDURE — 83605 ASSAY OF LACTIC ACID: CPT

## 2024-07-17 PROCEDURE — 12011 RPR F/E/E/N/L/M 2.5 CM/<: CPT

## 2024-07-17 PROCEDURE — 72125 CT NECK SPINE W/O DYE: CPT | Mod: MC

## 2024-07-17 PROCEDURE — 70486 CT MAXILLOFACIAL W/O DYE: CPT | Mod: MC

## 2024-07-17 PROCEDURE — 85025 COMPLETE CBC W/AUTO DIFF WBC: CPT

## 2024-07-17 PROCEDURE — 74177 CT ABD & PELVIS W/CONTRAST: CPT | Mod: MC

## 2024-07-17 PROCEDURE — 80307 DRUG TEST PRSMV CHEM ANLYZR: CPT

## 2024-07-17 PROCEDURE — 90715 TDAP VACCINE 7 YRS/> IM: CPT

## 2024-07-17 PROCEDURE — 86850 RBC ANTIBODY SCREEN: CPT

## 2024-07-17 PROCEDURE — 80053 COMPREHEN METABOLIC PANEL: CPT

## 2024-07-17 PROCEDURE — 85610 PROTHROMBIN TIME: CPT

## 2024-07-17 PROCEDURE — 99284 EMERGENCY DEPT VISIT MOD MDM: CPT | Mod: 25

## 2024-07-17 PROCEDURE — 90471 IMMUNIZATION ADMIN: CPT

## 2024-07-17 PROCEDURE — T1013: CPT

## 2024-07-17 PROCEDURE — 81003 URINALYSIS AUTO W/O SCOPE: CPT

## 2024-07-17 PROCEDURE — 71260 CT THORAX DX C+: CPT | Mod: MC

## 2024-07-17 PROCEDURE — 83690 ASSAY OF LIPASE: CPT

## 2024-07-17 PROCEDURE — 86901 BLOOD TYPING SEROLOGIC RH(D): CPT

## 2024-07-17 PROCEDURE — 36415 COLL VENOUS BLD VENIPUNCTURE: CPT

## 2024-07-17 NOTE — ED ADULT NURSE REASSESSMENT NOTE - NS ED NURSE REASSESS COMMENT FT1
Pt is a&o x4 breathing equal and unlabored; pt awaiting for PA to glue abrasion on forehead; pt aware of plan of care.
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
